# Patient Record
Sex: MALE | Race: WHITE | NOT HISPANIC OR LATINO | Employment: FULL TIME | URBAN - METROPOLITAN AREA
[De-identification: names, ages, dates, MRNs, and addresses within clinical notes are randomized per-mention and may not be internally consistent; named-entity substitution may affect disease eponyms.]

---

## 2017-10-10 ENCOUNTER — GENERIC CONVERSION - ENCOUNTER (OUTPATIENT)
Dept: OTHER | Facility: OTHER | Age: 22
End: 2017-10-10

## 2017-10-11 ENCOUNTER — APPOINTMENT (OUTPATIENT)
Dept: PHYSICAL THERAPY | Facility: CLINIC | Age: 22
End: 2017-10-11
Payer: COMMERCIAL

## 2017-10-11 DIAGNOSIS — M25.511 PAIN IN RIGHT SHOULDER: ICD-10-CM

## 2017-10-11 PROCEDURE — 97140 MANUAL THERAPY 1/> REGIONS: CPT

## 2017-10-11 PROCEDURE — 97161 PT EVAL LOW COMPLEX 20 MIN: CPT

## 2017-10-11 PROCEDURE — 97010 HOT OR COLD PACKS THERAPY: CPT

## 2017-10-11 PROCEDURE — G8990 OTHER PT/OT CURRENT STATUS: HCPCS

## 2017-10-11 PROCEDURE — 97014 ELECTRIC STIMULATION THERAPY: CPT

## 2017-10-11 PROCEDURE — G8991 OTHER PT/OT GOAL STATUS: HCPCS

## 2017-10-13 ENCOUNTER — APPOINTMENT (OUTPATIENT)
Dept: PHYSICAL THERAPY | Facility: CLINIC | Age: 22
End: 2017-10-13
Payer: COMMERCIAL

## 2017-10-13 PROCEDURE — 97010 HOT OR COLD PACKS THERAPY: CPT

## 2017-10-13 PROCEDURE — 97140 MANUAL THERAPY 1/> REGIONS: CPT

## 2017-10-13 PROCEDURE — 97014 ELECTRIC STIMULATION THERAPY: CPT

## 2017-10-13 PROCEDURE — 97110 THERAPEUTIC EXERCISES: CPT

## 2017-10-18 ENCOUNTER — APPOINTMENT (OUTPATIENT)
Dept: PHYSICAL THERAPY | Facility: CLINIC | Age: 22
End: 2017-10-18
Payer: COMMERCIAL

## 2017-10-18 PROCEDURE — 97110 THERAPEUTIC EXERCISES: CPT

## 2017-10-18 PROCEDURE — 97010 HOT OR COLD PACKS THERAPY: CPT

## 2017-10-18 PROCEDURE — 97140 MANUAL THERAPY 1/> REGIONS: CPT

## 2017-10-18 PROCEDURE — 97014 ELECTRIC STIMULATION THERAPY: CPT

## 2017-10-18 PROCEDURE — 97112 NEUROMUSCULAR REEDUCATION: CPT

## 2017-10-20 ENCOUNTER — APPOINTMENT (OUTPATIENT)
Dept: PHYSICAL THERAPY | Facility: CLINIC | Age: 22
End: 2017-10-20
Payer: COMMERCIAL

## 2017-10-20 PROCEDURE — 97140 MANUAL THERAPY 1/> REGIONS: CPT

## 2017-10-20 PROCEDURE — 97112 NEUROMUSCULAR REEDUCATION: CPT

## 2017-10-20 PROCEDURE — 97014 ELECTRIC STIMULATION THERAPY: CPT

## 2017-10-20 PROCEDURE — 97110 THERAPEUTIC EXERCISES: CPT

## 2017-10-25 ENCOUNTER — APPOINTMENT (OUTPATIENT)
Dept: PHYSICAL THERAPY | Facility: CLINIC | Age: 22
End: 2017-10-25
Payer: COMMERCIAL

## 2017-10-27 ENCOUNTER — APPOINTMENT (OUTPATIENT)
Dept: PHYSICAL THERAPY | Facility: CLINIC | Age: 22
End: 2017-10-27
Payer: COMMERCIAL

## 2017-11-09 ENCOUNTER — GENERIC CONVERSION - ENCOUNTER (OUTPATIENT)
Dept: OTHER | Facility: OTHER | Age: 22
End: 2017-11-09

## 2018-09-26 ENCOUNTER — OFFICE VISIT (OUTPATIENT)
Dept: FAMILY MEDICINE CLINIC | Facility: CLINIC | Age: 23
End: 2018-09-26
Payer: COMMERCIAL

## 2018-09-26 VITALS
DIASTOLIC BLOOD PRESSURE: 70 MMHG | HEART RATE: 80 BPM | WEIGHT: 232 LBS | RESPIRATION RATE: 20 BRPM | HEIGHT: 72 IN | BODY MASS INDEX: 31.42 KG/M2 | SYSTOLIC BLOOD PRESSURE: 104 MMHG

## 2018-09-26 DIAGNOSIS — F41.9 ANXIETY AND DEPRESSION: ICD-10-CM

## 2018-09-26 DIAGNOSIS — E80.4 GILBERT'S SYNDROME: ICD-10-CM

## 2018-09-26 DIAGNOSIS — S96.101S: ICD-10-CM

## 2018-09-26 DIAGNOSIS — F32.A ANXIETY AND DEPRESSION: ICD-10-CM

## 2018-09-26 DIAGNOSIS — R55 NEAR SYNCOPE: ICD-10-CM

## 2018-09-26 DIAGNOSIS — M79.674 PAIN OF TOE OF RIGHT FOOT: Primary | ICD-10-CM

## 2018-09-26 PROBLEM — Z87.19 S/P LEFT INGUINAL HERNIA REPAIR: Status: ACTIVE | Noted: 2018-01-16

## 2018-09-26 PROBLEM — Z98.890 S/P LEFT INGUINAL HERNIA REPAIR: Status: ACTIVE | Noted: 2018-01-16

## 2018-09-26 PROCEDURE — 99214 OFFICE O/P EST MOD 30 MIN: CPT | Performed by: FAMILY MEDICINE

## 2018-09-26 PROCEDURE — 3008F BODY MASS INDEX DOCD: CPT | Performed by: FAMILY MEDICINE

## 2018-09-26 RX ORDER — PAROXETINE HYDROCHLORIDE 20 MG/1
20 TABLET, FILM COATED ORAL DAILY
Qty: 30 TABLET | Refills: 3 | Status: SHIPPED | OUTPATIENT
Start: 2018-09-26 | End: 2018-10-24 | Stop reason: SDUPTHER

## 2018-09-26 RX ORDER — PAROXETINE HYDROCHLORIDE 20 MG/1
10 TABLET, FILM COATED ORAL DAILY
Qty: 30 TABLET | Refills: 3 | Status: SHIPPED | OUTPATIENT
Start: 2018-09-26 | End: 2018-09-26 | Stop reason: SDUPTHER

## 2018-09-26 NOTE — ASSESSMENT & PLAN NOTE
Patient's start counseling tomorrow    Paxil was ordered risk and benefit discussed especially drowsiness increased risk of suicidal ideation

## 2018-09-26 NOTE — PROGRESS NOTES
Assessment/Plan:  1  Right toe pain 2  Question extensor tendon tear patient to continue spleen, MRI of area is ordered  Copy going to patient's podiatrist, Dr Luis Warner  3  Anxiety/depression, patient is to continue 10 you with counseling he starts tomorrow  Will start Paxil risk and benefit discussed especially increased risk of suicidal ideation as well as drowsiness  4  Near-syncope status post ER evaluation workup benign  5  Gilbert's syndrome, chronic elevated bilirubin  6  Recheck 1 month, sooner if needed          Pain of toe of right foot  MRI is ordered    Unspecified injury of muscle and tendon of long extensor muscle of toe at ankle and foot level, right foot, sequela  Continue Fleet per Podiatry check MRI rule out tendon rupture/tear    Anxiety and depression  Patient's start counseling tomorrow  Paxil was ordered risk and benefit discussed especially drowsiness increased risk of suicidal ideation    Gilbert's syndrome  Chronic ER blood work shows mildly elevated bilirubin       Diagnoses and all orders for this visit:    Pain of toe of right foot  -     Cancel: MRI foot/forefoot toest right wo contrast; Future  -     MRI foot/forefoot toest right wo contrast; Future    Unspecified injury of muscle and tendon of long extensor muscle of toe at ankle and foot level, right foot, sequela  -     Cancel: MRI foot/forefoot toest right wo contrast; Future  -     MRI foot/forefoot toest right wo contrast; Future    Anxiety and depression  -     PARoxetine (PAXIL) 20 mg tablet; Take 0 5 tablets (10 mg total) by mouth daily    Near syncope    Gilbert's syndrome          Subjective: pt here for an er follow up for toe pain  Pt also states he also went to er for anxiety  MYKE Mtz     Patient ID: [place] is a 26 y o  male  The other week will patient was stepping down from recurrent he felt a pop and excruciating pain right 2nd toe  Patient was seen at [facility] ER, x-rays were negative  Patient was seen by Podiatry yesterday  Placed in a splint  Patient still with pain  Second problem patient had a near syncopal episode was seen in the ER last night  Blood work and workup was discussed from [facility] with the patient  Patient feels mildly depressed and very anxious since the death of his grandmother few months ago  Negative SI/HI  ER did set him up with psychologist and has a an appointment tomorrow for counseling  Discussed risk and benefit of pharmacologic treatment  Patient wishes to start treatment  The following portions of the patient's history were reviewed and updated as appropriate: allergies, current medications, past family history, past medical history, past social history, past surgical history and problem list     Review of Systems   Constitutional: Negative  HENT: Negative  Eyes: Negative  Respiratory: Negative  Cardiovascular: Negative  Gastrointestinal: Negative  Genitourinary: Negative  Musculoskeletal:        HPI   Skin: Negative  Allergic/Immunologic: Positive for environmental allergies  Neurological:        HPI   Hematological: Negative  Psychiatric/Behavioral:        HPI         Objective:    Vitals:    09/26/18 1245   BP: 104/70   BP Location: Left arm   Patient Position: Sitting   Cuff Size: Large   Pulse: 80   Resp: 20   Weight: 105 kg (232 lb)   Height: 5' 11 5" (1 816 m)          Physical Exam   Constitutional: He is oriented to person, place, and time  He appears well-developed and well-nourished  HENT:   Head: Normocephalic and atraumatic  Mouth/Throat: Oropharynx is clear and moist    Eyes: Conjunctivae and EOM are normal  Pupils are equal, round, and reactive to light  No scleral icterus  Neck: Neck supple  No thyromegaly present  Cardiovascular: Normal rate, regular rhythm and normal heart sounds  Pulmonary/Chest: Effort normal and breath sounds normal    Abdominal: Soft  There is no tenderness  Musculoskeletal: He exhibits tenderness  Right 2nd toe with exquisite tenderness at the IP joint with ecchymoses question decreased range of motion   Lymphadenopathy:     He has no cervical adenopathy  Neurological: He is alert and oriented to person, place, and time  Skin: Skin is warm and dry     Ecchymosis right 2nd toe extensor surface IP joint   Psychiatric:   Mildly anxious

## 2018-09-26 NOTE — PATIENT INSTRUCTIONS
Follow-up with counseling, start Paxil at bedtime, complete MRI, return in 4 weeks, sooner if needed

## 2018-10-08 ENCOUNTER — HOSPITAL ENCOUNTER (OUTPATIENT)
Dept: MRI IMAGING | Facility: HOSPITAL | Age: 23
Discharge: HOME | End: 2018-10-08
Payer: COMMERCIAL

## 2018-10-08 DIAGNOSIS — S96.101S: ICD-10-CM

## 2018-10-08 DIAGNOSIS — M79.674 PAIN OF TOE OF RIGHT FOOT: ICD-10-CM

## 2018-10-08 PROCEDURE — 73718 MRI LOWER EXTREMITY W/O DYE: CPT

## 2018-10-22 PROBLEM — F32.9 MAJOR DEPRESSIVE DISORDER, SINGLE EPISODE WITH ANXIOUS DISTRESS: Status: ACTIVE | Noted: 2018-09-27

## 2018-10-24 ENCOUNTER — OFFICE VISIT (OUTPATIENT)
Dept: FAMILY MEDICINE CLINIC | Facility: CLINIC | Age: 23
End: 2018-10-24
Payer: COMMERCIAL

## 2018-10-24 VITALS
BODY MASS INDEX: 31.91 KG/M2 | HEART RATE: 72 BPM | SYSTOLIC BLOOD PRESSURE: 120 MMHG | WEIGHT: 232 LBS | DIASTOLIC BLOOD PRESSURE: 74 MMHG

## 2018-10-24 DIAGNOSIS — M79.674 PAIN OF TOE OF RIGHT FOOT: ICD-10-CM

## 2018-10-24 DIAGNOSIS — J30.9 ALLERGIC RHINITIS, UNSPECIFIED SEASONALITY, UNSPECIFIED TRIGGER: ICD-10-CM

## 2018-10-24 DIAGNOSIS — S96.101S: ICD-10-CM

## 2018-10-24 DIAGNOSIS — Z23 NEED FOR INFLUENZA VACCINATION: ICD-10-CM

## 2018-10-24 DIAGNOSIS — F41.9 ANXIETY AND DEPRESSION: Primary | ICD-10-CM

## 2018-10-24 DIAGNOSIS — E80.4 GILBERT'S SYNDROME: ICD-10-CM

## 2018-10-24 DIAGNOSIS — F32.9 MAJOR DEPRESSIVE DISORDER, SINGLE EPISODE WITH ANXIOUS DISTRESS: ICD-10-CM

## 2018-10-24 DIAGNOSIS — Z13.220 SCREENING FOR LIPID DISORDERS: ICD-10-CM

## 2018-10-24 DIAGNOSIS — Z00.00 HEALTH CARE MAINTENANCE: ICD-10-CM

## 2018-10-24 DIAGNOSIS — F32.A ANXIETY AND DEPRESSION: Primary | ICD-10-CM

## 2018-10-24 PROCEDURE — 90471 IMMUNIZATION ADMIN: CPT

## 2018-10-24 PROCEDURE — 99214 OFFICE O/P EST MOD 30 MIN: CPT | Performed by: FAMILY MEDICINE

## 2018-10-24 PROCEDURE — 90686 IIV4 VACC NO PRSV 0.5 ML IM: CPT

## 2018-10-24 PROCEDURE — 1036F TOBACCO NON-USER: CPT | Performed by: FAMILY MEDICINE

## 2018-10-24 RX ORDER — PAROXETINE HYDROCHLORIDE 20 MG/1
20 TABLET, FILM COATED ORAL DAILY
Qty: 30 TABLET | Refills: 5 | Status: SHIPPED | OUTPATIENT
Start: 2018-10-24 | End: 2018-11-26 | Stop reason: SDUPTHER

## 2018-10-24 NOTE — PROGRESS NOTES
Assessment/Plan:    1  Depression/anxiety, doing much better on Paxil 20 mg refill was given  2  Toe pain/abnormal MRI possible tear patient is following with Podiatry, Dr Kyler Nagel  3  Gilbert's syndrome, blood work is ordered monitor bilirubin  4  Allergic rhinitis, asymptomatic patient may use Claritin if needed  5  Health care maintenance, routine blood work is ordered, flu vaccine was given  6  Recheck 4 months, sooner if need        Health care maintenance  Flu vaccine today  Routine blood work is ordered    Allergic rhinitis  Asymptomatic at present  Patient use Claritin if needed    Unspecified injury of muscle and tendon of long extensor muscle of toe at ankle and foot level, right foot, sequela  Follow-up with Podiatry    Pain of toe of right foot  MRI discussed, patient follows up with Podiatry    Major depressive disorder, single episode with anxious distress  Stable continue Paxil    Gilbert's syndrome  Monitor bilirubin    Anxiety and depression  Stable continue Paxil       Diagnoses and all orders for this visit:    Anxiety and depression  -     PARoxetine (PAXIL) 20 mg tablet; Take 1 tablet (20 mg total) by mouth daily  -     CBC; Future  -     Comprehensive metabolic panel; Future  -     Lipid Panel with Direct LDL reflex; Future  -     TSH, 3rd generation with Free T4 reflex; Future  -     Urinalysis with reflex to microscopic; Future    Pain of toe of right foot  -     CBC; Future  -     Comprehensive metabolic panel; Future  -     Lipid Panel with Direct LDL reflex; Future  -     TSH, 3rd generation with Free T4 reflex; Future  -     Urinalysis with reflex to microscopic; Future    Unspecified injury of muscle and tendon of long extensor muscle of toe at ankle and foot level, right foot, sequela  -     CBC; Future  -     Comprehensive metabolic panel; Future  -     Lipid Panel with Direct LDL reflex; Future  -     TSH, 3rd generation with Free T4 reflex;  Future  -     Urinalysis with reflex to microscopic; Future    Major depressive disorder, single episode with anxious distress  -     CBC; Future  -     Comprehensive metabolic panel; Future  -     Lipid Panel with Direct LDL reflex; Future  -     TSH, 3rd generation with Free T4 reflex; Future  -     Urinalysis with reflex to microscopic; Future    Gilbert's syndrome  -     CBC; Future  -     Comprehensive metabolic panel; Future  -     Lipid Panel with Direct LDL reflex; Future  -     TSH, 3rd generation with Free T4 reflex; Future  -     Urinalysis with reflex to microscopic; Future    Health care maintenance  -     CBC; Future  -     Comprehensive metabolic panel; Future  -     Lipid Panel with Direct LDL reflex; Future  -     TSH, 3rd generation with Free T4 reflex; Future  -     Urinalysis with reflex to microscopic; Future    Allergic rhinitis, unspecified seasonality, unspecified trigger  -     CBC; Future  -     Comprehensive metabolic panel; Future  -     Lipid Panel with Direct LDL reflex; Future  -     TSH, 3rd generation with Free T4 reflex; Future  -     Urinalysis with reflex to microscopic; Future    Screening for lipid disorders  -     CBC; Future  -     Comprehensive metabolic panel; Future  -     Lipid Panel with Direct LDL reflex; Future  -     TSH, 3rd generation with Free T4 reflex; Future  -     Urinalysis with reflex to microscopic; Future          Subjective: patient is here for a 4 week f/u re: anxiety/depression  Patient states that Paxil is working well  Patient still c/o right 2nd toe pain  Patient is aware of the MRI results  ak     Patient ID: Sanjay Rodriguez is a 26 y o  male  Patient's anxiety depression doing much better on Paxil  Patient feels that his really helped  Patient has seen podiatry, Dr Zahira Prieto and is in a splint by Podiatry for a tendon tear          The following portions of the patient's history were reviewed and updated as appropriate: allergies, current medications, past family history, past medical history, past social history, past surgical history and problem list     Review of Systems   Constitutional: Negative  HENT: Negative  Eyes: Negative  Respiratory: Negative  Cardiovascular: Negative  Gastrointestinal: Negative  Endocrine: Negative  Genitourinary: Negative  Musculoskeletal:        HPI   Skin: Negative  Allergic/Immunologic: Negative  Neurological: Negative  Hematological: Negative  Psychiatric/Behavioral:        HPI         Objective:      /74   Pulse 72   Wt 105 kg (232 lb)   BMI 31 91 kg/m²          Physical Exam   Constitutional: He is oriented to person, place, and time  He appears well-developed and well-nourished  HENT:   Head: Normocephalic and atraumatic  Mouth/Throat: Oropharynx is clear and moist    Eyes: Pupils are equal, round, and reactive to light  Conjunctivae and EOM are normal  No scleral icterus  Neck: Neck supple  No thyromegaly present  Cardiovascular: Normal rate, regular rhythm and normal heart sounds  Pulmonary/Chest: Effort normal and breath sounds normal    Abdominal: Soft  Bowel sounds are normal  There is no tenderness  Musculoskeletal: He exhibits no edema  Lymphadenopathy:     He has no cervical adenopathy  Neurological: He is alert and oriented to person, place, and time  No cranial nerve deficit  Skin: Skin is warm and dry  Psychiatric: He has a normal mood and affect

## 2018-11-26 ENCOUNTER — TELEPHONE (OUTPATIENT)
Dept: FAMILY MEDICINE CLINIC | Facility: CLINIC | Age: 23
End: 2018-11-26

## 2018-11-26 DIAGNOSIS — F41.9 ANXIETY AND DEPRESSION: ICD-10-CM

## 2018-11-26 DIAGNOSIS — F32.A ANXIETY AND DEPRESSION: ICD-10-CM

## 2018-11-26 RX ORDER — PAROXETINE HYDROCHLORIDE 20 MG/1
20 TABLET, FILM COATED ORAL DAILY
Qty: 30 TABLET | Refills: 5 | Status: SHIPPED | OUTPATIENT
Start: 2018-11-26 | End: 2019-02-21 | Stop reason: ALTCHOICE

## 2018-11-26 NOTE — TELEPHONE ENCOUNTER
Patient is looking for a refill on his Paxil 20mg sent to the [facility] on [address] in [place]  He has one pill left  Thank you!

## 2018-11-28 ENCOUNTER — TELEPHONE (OUTPATIENT)
Dept: FAMILY MEDICINE CLINIC | Facility: CLINIC | Age: 23
End: 2018-11-28

## 2018-11-28 NOTE — TELEPHONE ENCOUNTER
Pt called and states that he is on his mothers insurance and she is in the process of getting another job which means pt  Will be out of insurance for about the next 3 months if mother gets the job,I told pt  He should call around to different pharmacies and check prices if pt would have to pay out of pocket for this, I told pt  I would also check with you to see if there was something cheaper

## 2018-11-29 NOTE — TELEPHONE ENCOUNTER
Dinner Paxil is 1 the cheapest anxiety/depression medications there is  I do not believe there is a cheaper alternative    I agree, patient to call around to see if he can find a cheaper at another pharmacy

## 2018-12-03 ENCOUNTER — APPOINTMENT (OUTPATIENT)
Dept: LAB | Facility: HOSPITAL | Age: 23
End: 2018-12-03
Payer: COMMERCIAL

## 2018-12-03 ENCOUNTER — TELEPHONE (OUTPATIENT)
Dept: FAMILY MEDICINE CLINIC | Facility: CLINIC | Age: 23
End: 2018-12-03

## 2018-12-03 ENCOUNTER — HOSPITAL ENCOUNTER (OUTPATIENT)
Dept: CT IMAGING | Facility: HOSPITAL | Age: 23
Discharge: HOME | End: 2018-12-03
Payer: COMMERCIAL

## 2018-12-03 ENCOUNTER — OFFICE VISIT (OUTPATIENT)
Dept: FAMILY MEDICINE CLINIC | Facility: CLINIC | Age: 23
End: 2018-12-03
Payer: COMMERCIAL

## 2018-12-03 ENCOUNTER — TELEPHONE (OUTPATIENT)
Dept: NEUROLOGY | Facility: CLINIC | Age: 23
End: 2018-12-03

## 2018-12-03 ENCOUNTER — APPOINTMENT (OUTPATIENT)
Dept: LAB | Facility: CLINIC | Age: 23
End: 2018-12-03
Payer: COMMERCIAL

## 2018-12-03 VITALS
BODY MASS INDEX: 32.51 KG/M2 | HEIGHT: 72 IN | WEIGHT: 240 LBS | RESPIRATION RATE: 16 BRPM | SYSTOLIC BLOOD PRESSURE: 118 MMHG | DIASTOLIC BLOOD PRESSURE: 68 MMHG | HEART RATE: 80 BPM

## 2018-12-03 DIAGNOSIS — R41.3 MEMORY DEFICIT: ICD-10-CM

## 2018-12-03 DIAGNOSIS — F41.0 PANIC ATTACK: ICD-10-CM

## 2018-12-03 DIAGNOSIS — F32.9 MAJOR DEPRESSIVE DISORDER, SINGLE EPISODE WITH ANXIOUS DISTRESS: ICD-10-CM

## 2018-12-03 DIAGNOSIS — F32.A ANXIETY AND DEPRESSION: ICD-10-CM

## 2018-12-03 DIAGNOSIS — R40.20 LOSS OF CONSCIOUSNESS (HCC): ICD-10-CM

## 2018-12-03 DIAGNOSIS — F41.9 ANXIETY AND DEPRESSION: ICD-10-CM

## 2018-12-03 DIAGNOSIS — E66.9 OBESITY (BMI 30.0-34.9): ICD-10-CM

## 2018-12-03 DIAGNOSIS — R41.3 MEMORY DEFICIT: Primary | ICD-10-CM

## 2018-12-03 LAB
ALBUMIN SERPL BCP-MCNC: 4 G/DL (ref 3.5–5)
ALP SERPL-CCNC: 45 U/L (ref 46–116)
ALT SERPL W P-5'-P-CCNC: 40 U/L (ref 12–78)
ANION GAP SERPL CALCULATED.3IONS-SCNC: 10 MMOL/L (ref 4–13)
AST SERPL W P-5'-P-CCNC: 24 U/L (ref 5–45)
BILIRUB SERPL-MCNC: 1.48 MG/DL (ref 0.2–1)
BILIRUB UR QL STRIP: NEGATIVE
BUN SERPL-MCNC: 16 MG/DL (ref 5–25)
CALCIUM SERPL-MCNC: 9.3 MG/DL (ref 8.3–10.1)
CHLORIDE SERPL-SCNC: 102 MMOL/L (ref 100–108)
CLARITY UR: CLEAR
CO2 SERPL-SCNC: 26 MMOL/L (ref 21–32)
COLOR UR: YELLOW
CREAT SERPL-MCNC: 0.79 MG/DL (ref 0.6–1.3)
ERYTHROCYTE [DISTWIDTH] IN BLOOD BY AUTOMATED COUNT: 12.5 % (ref 11.6–15.1)
GFR SERPL CREATININE-BSD FRML MDRD: 127 ML/MIN/1.73SQ M
GLUCOSE P FAST SERPL-MCNC: 96 MG/DL (ref 65–99)
GLUCOSE UR STRIP-MCNC: NEGATIVE MG/DL
HCT VFR BLD AUTO: 48.7 % (ref 36.5–49.3)
HGB BLD-MCNC: 16.3 G/DL (ref 12–17)
HGB UR QL STRIP.AUTO: NEGATIVE
KETONES UR STRIP-MCNC: NEGATIVE MG/DL
LEUKOCYTE ESTERASE UR QL STRIP: NEGATIVE
MCH RBC QN AUTO: 30.2 PG (ref 26.8–34.3)
MCHC RBC AUTO-ENTMCNC: 33.5 G/DL (ref 31.4–37.4)
MCV RBC AUTO: 90 FL (ref 82–98)
NITRITE UR QL STRIP: NEGATIVE
PH UR STRIP.AUTO: 7.5 [PH] (ref 4.5–8)
PLATELET # BLD AUTO: 218 THOUSANDS/UL (ref 149–390)
PMV BLD AUTO: 9.6 FL (ref 8.9–12.7)
POTASSIUM SERPL-SCNC: 4 MMOL/L (ref 3.5–5.3)
PROT SERPL-MCNC: 7.8 G/DL (ref 6.4–8.2)
PROT UR STRIP-MCNC: NEGATIVE MG/DL
RBC # BLD AUTO: 5.4 MILLION/UL (ref 3.88–5.62)
SODIUM SERPL-SCNC: 138 MMOL/L (ref 136–145)
SP GR UR STRIP.AUTO: 1.02 (ref 1–1.03)
TSH SERPL DL<=0.05 MIU/L-ACNC: 1.96 UIU/ML (ref 0.36–3.74)
UROBILINOGEN UR QL STRIP.AUTO: 0.2 E.U./DL
WBC # BLD AUTO: 5.51 THOUSAND/UL (ref 4.31–10.16)

## 2018-12-03 PROCEDURE — 81003 URINALYSIS AUTO W/O SCOPE: CPT

## 2018-12-03 PROCEDURE — 82607 VITAMIN B-12: CPT

## 2018-12-03 PROCEDURE — 3008F BODY MASS INDEX DOCD: CPT | Performed by: FAMILY MEDICINE

## 2018-12-03 PROCEDURE — 84443 ASSAY THYROID STIM HORMONE: CPT

## 2018-12-03 PROCEDURE — 82746 ASSAY OF FOLIC ACID SERUM: CPT

## 2018-12-03 PROCEDURE — 82300 ASSAY OF CADMIUM: CPT

## 2018-12-03 PROCEDURE — 83825 ASSAY OF MERCURY: CPT

## 2018-12-03 PROCEDURE — 82175 ASSAY OF ARSENIC: CPT

## 2018-12-03 PROCEDURE — 99214 OFFICE O/P EST MOD 30 MIN: CPT | Performed by: FAMILY MEDICINE

## 2018-12-03 PROCEDURE — 86618 LYME DISEASE ANTIBODY: CPT

## 2018-12-03 PROCEDURE — 85027 COMPLETE CBC AUTOMATED: CPT

## 2018-12-03 PROCEDURE — 70450 CT HEAD/BRAIN W/O DYE: CPT

## 2018-12-03 PROCEDURE — 86592 SYPHILIS TEST NON-TREP QUAL: CPT

## 2018-12-03 PROCEDURE — 83655 ASSAY OF LEAD: CPT

## 2018-12-03 PROCEDURE — 80053 COMPREHEN METABOLIC PANEL: CPT

## 2018-12-03 PROCEDURE — 86617 LYME DISEASE ANTIBODY: CPT

## 2018-12-03 PROCEDURE — 36415 COLL VENOUS BLD VENIPUNCTURE: CPT

## 2018-12-03 RX ORDER — LORAZEPAM 0.5 MG/1
TABLET ORAL
Qty: 30 TABLET | Refills: 0 | Status: SHIPPED | OUTPATIENT
Start: 2018-12-03 | End: 2019-11-19 | Stop reason: ALTCHOICE

## 2018-12-03 NOTE — PATIENT INSTRUCTIONS

## 2018-12-03 NOTE — ASSESSMENT & PLAN NOTE
No driving  I need to report this to [facility] [address] [employer]    CT scan blood work an EEG are ordered

## 2018-12-03 NOTE — TELEPHONE ENCOUNTER
Radiology called stating there were no intercranial abnormalities notes on STAT CT scan  Pt was made aware of this and he will keep follow up appt with you next week  He knows he is to call for sooner appt if any other problems arise before his scheduled appt

## 2018-12-03 NOTE — PROGRESS NOTES
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  CT scan and blood work ordered today  EEG is ordered will consult Neurology  If this reoccurs patient is to have somebody call 911 and patient to go to the ER  3  Anxiety/major depression, continue Paxil at the present time  Patient was warned if he does have a seizure DIS order possibly will need to change medications this can lower seizure threshold  4  Panic attack which may be the cause of above symptomatology  Ativan was ordered risk and benefit discussed  5  Recheck 1 week, sooner if needed  6  No work this week until CT scan and blood work come back      Loss of consciousness ([address] )  No driving  I need to report this to [facility] [address] [employer] transportation  CT scan blood work an EEG are ordered    Memory deficit  Blood work CT scan EEG ordered    Panic attack  Ativan ordered, drowsiness discussed add addiction potential discussed       Diagnoses and all orders for this visit:    Memory deficit  -     CBC; Future  -     Comprehensive metabolic panel; Future  -     TSH, 3rd generation with Free T4 reflex; Future  -     Urinalysis with reflex to microscopic; Future  -     Vitamin B12; Future  -     Folate; Future  -     RPR; Future  -     Lyme Antibody Profile with reflex to WB; Future  -     Heavy metals screen; Future  -     CT head wo contrast; Future  -     EEG awake or drowsy routine; Future    Anxiety and depression  -     CBC; Future  -     Comprehensive metabolic panel; Future  -     TSH, 3rd generation with Free T4 reflex; Future  -     Urinalysis with reflex to microscopic; Future  -     Vitamin B12; Future  -     Folate; Future  -     RPR;  Future  -     Lyme Antibody Profile with reflex to WB; Future  -     Heavy metals screen; Future    Panic attack  -     CBC; Future  -     Comprehensive metabolic panel; Future  -     TSH, 3rd generation with Free T4 reflex; Future  -     Urinalysis with reflex to microscopic; Future  -     Vitamin B12; Future  -     Folate; Future  -     RPR; Future  -     Lyme Antibody Profile with reflex to WB; Future  -     Heavy metals screen; Future  -     LORazepam (ATIVAN) 0 5 mg tablet; Take 1 tablet 3 times daily as needed for anxiety or panic    Loss of consciousness (HCC)  -     CBC; Future  -     Comprehensive metabolic panel; Future  -     TSH, 3rd generation with Free T4 reflex; Future  -     Urinalysis with reflex to microscopic; Future  -     Vitamin B12; Future  -     Folate; Future  -     RPR; Future  -     Lyme Antibody Profile with reflex to WB; Future  -     Heavy metals screen; Future  -     CT head wo contrast; Future  -     EEG awake or drowsy routine; Future    Major depressive disorder, single episode with anxious distress  -     CBC; Future  -     Comprehensive metabolic panel; Future  -     TSH, 3rd generation with Free T4 reflex; Future  -     Urinalysis with reflex to microscopic; Future  -     Vitamin B12; Future  -     Folate; Future  -     RPR; Future  -     Lyme Antibody Profile with reflex to WB; Future  -     Heavy metals screen; Future    Obesity (BMI 30 0-34  9)          Subjective: Pt here with complains of high stress and more anxiety than before  Pt states he thinks he had a seizure last night per his girlfriend  MYKE Mtz     Patient ID: Saniya Valdez is a 26 y o  male  Last evening have patient go for weakness him in bed is eyes rolled back patient's back arch  Got very stiff about 2 min  No shaking no loss of bowel bladder  Patient does not have a memory of this  Patient's girlfriend stated this lasted about 2 min  Patient has never had any of the symptoms before  Patient did not bite his tongue or cheek  Patient states he was in his usual state of health yesterday but did receive a phone call from his child mother and this upset patient greatly before the onset of the symptoms  The following portions of the patient's history were reviewed and updated as appropriate: allergies, current medications, past family history, past medical history, past social history, past surgical history and problem list     Review of Systems   Constitutional: Negative  HENT:        HPI   Eyes: Negative  Respiratory: Negative  Cardiovascular: Negative  Gastrointestinal:        HPI   Endocrine: Negative  Genitourinary:        HPI   Musculoskeletal:        HPI   Skin: Negative  Allergic/Immunologic: Negative  Neurological:        HPI   Hematological: Negative  Psychiatric/Behavioral: Negative  Objective:      Vitals:    12/03/18 1244   BP: 118/68   BP Location: Left arm   Patient Position: Sitting   Cuff Size: Large   Pulse: 80   Resp: 16   Weight: 109 kg (240 lb)   Height: 5' 11 5" (1 816 m)        Physical Exam   Constitutional: He is oriented to person, place, and time  He appears well-developed and well-nourished  HENT:   Head: Normocephalic and atraumatic  Right Ear: External ear normal    Left Ear: External ear normal    Nose: Nose normal    Mouth/Throat: Oropharynx is clear and moist  No oropharyngeal exudate  Eyes: Pupils are equal, round, and reactive to light  Conjunctivae and EOM are normal    Neck: Neck supple  No thyromegaly present  Cardiovascular: Normal rate, regular rhythm and normal heart sounds  Pulmonary/Chest: Effort normal and breath sounds normal    Abdominal: Soft  Bowel sounds are normal  There is no tenderness  Musculoskeletal: He exhibits no edema, tenderness or deformity  Lymphadenopathy:     He has no cervical adenopathy  Neurological: He is alert and oriented to person, place, and time  He has normal reflexes  He displays normal reflexes   No cranial nerve deficit  He exhibits normal muscle tone  Coordination normal    Skin: Skin is warm and dry  Psychiatric: He has a normal mood and affect  BMI Counseling: Body mass index is 33 01 kg/m²  Discussed with patient's BMI with him  The BMI is above average  BMI counseling and education was provided to the patient  Nutrition recommendations include decreasing overall calorie intake

## 2018-12-04 ENCOUNTER — HOSPITAL ENCOUNTER (OUTPATIENT)
Dept: NEUROLOGY | Facility: AMBULATORY SURGERY CENTER | Age: 23
Discharge: HOME | End: 2018-12-04
Payer: COMMERCIAL

## 2018-12-04 DIAGNOSIS — R40.20 LOSS OF CONSCIOUSNESS (HCC): ICD-10-CM

## 2018-12-04 DIAGNOSIS — R41.3 MEMORY DEFICIT: ICD-10-CM

## 2018-12-04 LAB
FOLATE SERPL-MCNC: >20 NG/ML (ref 3.1–17.5)
RPR SER QL: NORMAL
VIT B12 SERPL-MCNC: 458 PG/ML (ref 100–900)

## 2018-12-04 PROCEDURE — 95816 EEG AWAKE AND DROWSY: CPT

## 2018-12-04 PROCEDURE — 95812 EEG 41-60 MINUTES: CPT | Performed by: PSYCHIATRY & NEUROLOGY

## 2018-12-06 LAB
ARSENIC BLD-MCNC: 10 UG/L (ref 2–23)
B BURGDOR IGG SER IA-ACNC: 0.11
B BURGDOR IGM SER IA-ACNC: 0.8
CADMIUM BLD-MCNC: NORMAL UG/L (ref 0–1.2)
LEAD BLD-MCNC: NORMAL UG/DL (ref 0–4)
MERCURY BLD-MCNC: NORMAL UG/L (ref 0–14.9)

## 2018-12-06 NOTE — PROGRESS NOTES
DEPARTMENT OF NEUROLOGICAL SCIENCES  [address] and [facility]        NEW PATIENT EVALUATION NOTE    Patient: Darren Del Cid  Medical Record Number: # [id number]  YOB: 1995  Date of visit: 12/10/2018    Referring provider: Glenis Graham DO    Diagnoses for this encounter:  1  Transient alteration of awareness     2  Panic attack     3  Tension headache       ASSESSMENT     Impression of this 27 yo M with normal developmental hx and ongoing psychosocial stressors, reporting one incident generalized feeling of tension in his body with 2 minutes of shaking with no significant post-ictal period and full awareness of the incident  This was preceded by an argument with his son's mother  No risk factor for seizure except for concussion >15 years ago with no sequelae  EEG and CT head were reassuring, laboratory testing so far was normal and clinical exam was non-focal       We discussed that the only way to prove his incident was a seizure or not is to have it captured on EEG and shown as such to be not  Besides that, per history and examination the chance of seizure is low, though juvenile myoclonic seizure may still occur in his age group  More likely though, his symptoms are anxiety-induced and functional as he was aware of the incident and given above  PLAN     · Reviewed normal CMP, elevated bilirubin (chronic), normal CBC and vit B12, elevated folate, normal TSH and RPR, urine tox  Minimally elevated Lyme IgM (essentially normal) and normal IgG  · Reviewed CT head as normal  EEG was normal  No new neuroimaging indicated  · Will not add on any new medications  · Cautioned against sleep deprivation and skipping of meals  · He will continue the Paxil and lorazepam as needed, f/u with his court order as scheduled  · There is no neurological deficit or finding suspicious for seizure  No work related restrictions required     · Thank you very much for sending me this interesting patient  · The patient has been instructed to call us about any new neurological problems or medication side effects  · Return to Clinic as needed  A total of 60 minutes were spent face-to-face with this patient, of which at least 50% was spent on counseling and coordination of care  We discussed the natural history of the patient's condition, differential diagnosis, level of diagnostic certainty, treatment alternatives and their side effects and possible complications  HISTORY OF PRESENT ILLNESS:     Mr Paresh Kessler is a 26 y o  right handed male with hx of major depression, Gilbert's syndrome, who has been referred to the [facility] for evaluation of memory issues, episode of transient alteration of consciousness  The patient was not accompanied today  History was obtained from patient  - He says he was a full-term product of a , no major childhood illnesses except ADHD on medications until 9th-10th grade  Normal schools and no known delay  High school graduate, now working as a  at [employer] high school and also at [employer]  Trying to enter college  - He says he has been having some "panic attacks" and thinks he may have had a "seizure" on 18  Contrary to what he told his PCP, he tells me today that he was able to recall "everything" about the incident itself  There was no warning to symptoms but he had an argument with his son's mother on the phone an hour before  This was regarding her lack of transportation on her end, but he was upset that she continues to rely on him for all her transportation needs for the last three years  He denies being depressed, no SI/HI  - He had a concussion at age 11 without any surgeries needed at the time and no sequelae from that  No known seizure history for him or for family    - He says he was still awake and was "just about to fall asleep" when incident occurred   He recalls feeling "tightness" starting in both upper thighs before spreading to his torso and arms, followed by the shaking and back arching  Denies losing control of his bowel or bladder  Denies biting his tongue or cheek; "my tongue rolled back"  Afterwards he tells me he was able to return to normal within "a minute"  He does endorse a sense of "feeling panicky" during the event  Patient's girlfriend was present at the time (not present here today) and had endorsed to PCP that it lasted 2 min  He denies having any further similar episodes afterwards  He was ordered to abstain from driving in the meantime  He does endorse having several "scared and panicky" episodes of a different kind, triggered by stress  Maternal grandmother with history of Alzheimer's supposedly  - He recounts having passed by a nursing home where his own grandmother stayed before dying, and he had one such event with heart racing, heavier breathing for several minutes  He has been taking Paxil since Sept 2018 and lorazepam as needed rarely  Main bothersome symptoms today are:   1  The incident as above  He feels otherwise normal and at his baseline  He denies any ongoing memory issues except forgetting his scheduled appt for therapy  He endorse no issues with carrying on his daily activities       Living Situation + ADLs: currently living with mother  Unmarried, with one son   He has primary custody of his 5yo son       REVIEW OF PAST MEDICAL, SOCIAL AND FAMILY HISTORY:  This is the list of problems as per our Medical Records:    Patient Active Problem List    Diagnosis Date Noted    Transient alteration of awareness 12/10/2018    Tension headache 12/10/2018    Loss of consciousness ([facility] [address] ) 12/03/2018    Memory deficit 12/03/2018    Panic attack 12/03/2018    Health care maintenance 10/24/2018    Major depressive disorder, single episode with anxious distress 09/27/2018    Pain of toe of right foot 09/26/2018    Unspecified injury of muscle and tendon of long extensor muscle of toe at ankle and foot level, right foot, sequela 09/26/2018    Anxiety and depression 09/26/2018    S/P left inguinal hernia repair 01/16/2018    Gilbert's syndrome 05/30/2013    Allergic rhinitis 05/23/2012     Past Medical History:   Diagnosis Date    ADHD (attention deficit hyperactivity disorder)     Anxiety      Past Surgical History:   Procedure Laterality Date    NO PAST SURGERIES        Allergies   Allergen Reactions    Azithromycin Hives    Cefuroxime Hives and Rash    Penicillins Hives and Rash      Outpatient Encounter Prescriptions as of 12/10/2018   Medication Sig Dispense Refill    LORazepam (ATIVAN) 0 5 mg tablet Take 1 tablet 3 times daily as needed for anxiety or panic 30 tablet 0    PARoxetine (PAXIL) 20 mg tablet Take 1 tablet (20 mg total) by mouth daily 30 tablet 5     No facility-administered encounter medications on file as of 12/10/2018  Social History   Substance Use Topics    Smoking status: Never Smoker    Smokeless tobacco: Never Used    Alcohol use Yes   He drinks socially 1-2 beer a week at most    Sexually active, more than one partner in history, no hx of STDs  Family History   Problem Relation Age of Onset    No Known Problems Mother     Hypertension Father       REVIEW OF SYSTEMS:  The patient has entered data on an intake form regarding present illness, past medical and surgical history, medications, allergies, family and social history, and a full review of 14 systems  I have reviewed this form with the patient, and all the relevant information has been included on this note  The full review of systems was negative except as stated in HPI and below  Constitutional: Positive for fatigue  Negative for appetite change and fever  HENT: Positive for congestion  Negative for hearing loss, tinnitus, trouble swallowing and voice change  Eyes: Negative  Negative for photophobia and pain  Respiratory: Negative  Negative for shortness of breath  Cardiovascular: Negative  Negative for palpitations  Gastrointestinal: Negative  Negative for nausea  Endocrine: Negative  Negative for cold intolerance and heat intolerance  Genitourinary: Negative  Negative for dysuria, frequency and urgency  Musculoskeletal: Positive for myalgias  Negative for neck pain  Skin: Negative  Allergic/Immunologic: Negative  Neurological: Positive for light-headedness and headaches  Negative for dizziness, tremors, seizures, syncope, facial asymmetry, speech difficulty, weakness and numbness  Positive for tingling  Positive for memory problems     Hematological: Negative  Does not bruise/bleed easily  Psychiatric/Behavioral: Positive for sleep disturbance (increased sleepiness, snoring)  Negative for confusion and hallucinations  The patient is nervous/anxious  PHYSICAL EXAMINATION:     Vital signs:  /60 (BP Location: Right arm, Patient Position: Sitting, Cuff Size: Standard)   Pulse 89   Ht 6' 1" (1 854 m)   Wt 113 kg (249 lb)   BMI 32 85 kg/m²     General:  Well-appearing, well nourished, pleasant patient in no acute distress  Mood and Fund of Knowledge are appropriate  Head:  Normocephalic, atraumatic  Oropharynx and conjunctiva are clear  Speech  No hypophonia, no bradylalia  No scanning speech  Language: Comprehension intact  Neck:  Supple, strong 5/5 forward flexion and retroflexion  Extremities: Range of motion is normal       Cognitive and Mental Exam:  The patient is alert, oriented to self, location, date and situation  Memory is normal to provide accurate details of health history    Cranial Nerves:  CN II:  Funduscopic examination reveals no papilledema  Direct and consensual light reflexes were equally reactive to light symmetrically  No afferent pupillary defect   Visual fields are full to confrontation     CN III / IV / VI: Extraocular movements were full, with normal pursuit and saccades  CN V:   Facial sensation to light touch was intact  CN VII: Face is symmetric with normal strength  CN VIII: Hearing was assessed using the Calibrated Finger Rub Auditory Screening Test (CALFRAST) and was not abnormal (Better than CALFRAST-Strong-70)  CN X:   Palate is up going bilaterally and symmetrically  CN XI:  Neck muscles are strong  CN XII: Tongue protrusion is at midline with normal movements  No dysarthria  Motor:    Dystonia: none  Dyskinesia: none  Myoclonus: none  Chorea: none  Tics: none  Partial MDS-UPDRS III:   Speech: 0  Facial Expression: 0  Tremor (Head):  0  Rest Tremor Severity (RUE/LUE/RLE/LLE/Lip): 0/0/0/0/0  Action Tremor of Hands (R): 0  Action Tremor of Hands (L): 0  Finger tapping (R): 0  Finger tapping (L): 0  Hand clenching (R): 0  Hand clenching (L): 0  KALIE Hand (R): 0  KALIE Hand (L): 0    Arising From Chair: 0  Posture: 0  Gait: 0  Freezing of Gait: 0  Postural Stability: 0  Body Bradykinesia: 0  -------------------------------------------------------------------------------------    Muscle Strength Right Left  Muscle Strength Right Left   Deltoid 5/5 5/5  Hip Adductors 5/5 5/5   Biceps 5/5 5/5  Hip Abductors 5/5 5/5   Triceps 5/5 5/5  Knee Extensors 5/5 5/5   Wrist Extensors 5/5 5/5  Knee Flexors 5/5 5/5   Wrist Flexors 5/5 5/5  Ankle Extensors 5/5 5/5    5/5 5/5  Ankle Flexors 5/5 5/5   Finger Abductors 5/5 5/5       Hip Flexors 5/5 5/5   Hip Extensors 5/5 5/5     Sensory  Intact to Light Touch, Temperature, and vibration sense in all extremities       Coordination:  Finger-to-nose-finger: normal  Finger-following-finger: normal     Gait:  Normal comprehensive gait evaluation, has normal raising, stance, gait, turns, tandem gait, tip-toes, heels and Pull test       Reflexes:    Right Left   Biceps 2/4 2/4   Brachioradialis 2/4 2/4   Triceps 2/4 2/4   Knee 2/4 2/4   Ankle 2/4 2/4      Plantar cutaneous reflex:  Right: flexor  Left: flexor      REVIEW OF ANCILLARY TESTS:   Results for orders placed or performed during the hospital encounter of 18   EEG awake or drowsy routine    Narrative    ELECTROENCEPHALOGRAM (EEG)    Patient Name:  Marquis Heaton  MRN: [id number]    :  1995 File #: [facility]    Age: 26 y o  Begin Date/Time: 2018  3:21 PM       Report date: 2018          Study type: Routine EEG     ICD 10 diagnosis: Spells/Fit NOS R56 9    Start time:   End time: 1504    ---------------------------------------------------------------------------  ----------------------------------------   Patient History:    26year old male who is having a routine EEG following a witnessed episode   of seizure-like activity on 18  Patient reports that there was   generalized shaking, his eyes rolled back, and the entire event lasted 90   seconds to 2 minutes  Denies LOC, tongue bite, or incontinence  Prior to   the event he states that he was in his usual state of health  Patient states that post-ictally he felt tired and fell asleep 20 minutes   later  No known family history of seizures  Maternal grandmother had Alzheimer's  This recording was observed in a 26 y o  male to determine whether spells   of are seizures  Medications include:     Prior to Admission medications    Medication Sig Start Date Authorizing Provider   LORazepam (ATIVAN) 0 5 mg tablet Take 1 tablet 3 times daily as needed for   anxiety or panic 12/3/18 Jamar Schmid DO   PARoxetine (PAXIL) 20 mg tablet Take 1 tablet (20 mg total) by mouth daily   18 Jamar Schmid DO   ---------------------------------------------------------------------------  ----------------------------------------   Description of Procedure:    32 channel digital recording with electrodes placed according to the   International 10-20 system with additional T1/T2 electrodes, EOG, EKG, and   simultaneous video   The recording was technically satisfactory  ---------------------------------------------------------------------------  ----------------------------------------   EEG Description:    The recording was performed with the patient awake, drowsy,  He was   oriented to person , place and time     During wakefulness, the background is symmetric with a well-organized, low   amplitude beta activity is present anteriorly and low-moderate amplitude   alpha activity posteriorly  There is a symmetric 9 to 10  Hz posterior   dominant rhythm that attenuates with eye opening  Drowsiness is characterized by attenuation of the alpha rhythm, central   theta activity, presence of vertex waves    ---------------------------------------------------------------------------  ----------------------------------------   Activation Procedures:    Hyperventilation was performed for 3-4 minutes and did not produce any   changes  Stepped photic stimulation between 1-20 cps was performed and induced   symmetric photic driving  Other findings: The single lead ECG demonstrated normal sinus rhythm     ---------------------------------------------------------------------------  ----------------------------------------   EEG Interpretation: This Routine EEG recorded during wakefulness, drowsiness,  Is normal     Jorge Alberto Yang DO   [facility] [place]       Results for orders placed or performed during the hospital encounter of 12/03/18   CT head wo contrast    Narrative    CT BRAIN - WITHOUT CONTRAST    INDICATION:   R41 3: Other amnesia  R40 20: Unspecified coma  COMPARISON:  5/2/2014  TECHNIQUE:  CT examination of the brain was performed  In addition to axial images, coronal 2D reformatted images were created and submitted for interpretation  Radiation dose length product (DLP) for this visit:  1011 mGy-cm     This examination, like all CT scans performed in the [facility], was performed utilizing techniques to minimize radiation dose exposure, including the use of iterative   reconstruction and automated exposure control  IMAGE QUALITY:  Diagnostic  FINDINGS:    PARENCHYMA:  No intracranial mass, mass effect or midline shift  No CT signs of acute infarction  No acute parenchymal hemorrhage  VENTRICLES AND EXTRA-AXIAL SPACES:  Normal for the patient's age  VISUALIZED ORBITS AND PARANASAL SINUSES:  Unremarkable  CALVARIUM AND EXTRACRANIAL SOFT TISSUES:  Normal       Impression    No acute intracranial abnormality

## 2018-12-07 LAB
B BURGDOR IGG PATRN SER IB-IMP: NEGATIVE
B BURGDOR IGM PATRN SER IB-IMP: NEGATIVE
B BURGDOR18KD IGG SER QL IB: NORMAL
B BURGDOR23KD IGG SER QL IB: NORMAL
B BURGDOR23KD IGM SER QL IB: NORMAL
B BURGDOR28KD IGG SER QL IB: NORMAL
B BURGDOR30KD IGG SER QL IB: NORMAL
B BURGDOR39KD IGG SER QL IB: NORMAL
B BURGDOR39KD IGM SER QL IB: NORMAL
B BURGDOR41KD IGG SER QL IB: NORMAL
B BURGDOR41KD IGM SER QL IB: NORMAL
B BURGDOR45KD IGG SER QL IB: NORMAL
B BURGDOR58KD IGG SER QL IB: NORMAL
B BURGDOR66KD IGG SER QL IB: NORMAL
B BURGDOR93KD IGG SER QL IB: NORMAL

## 2018-12-10 ENCOUNTER — OFFICE VISIT (OUTPATIENT)
Dept: FAMILY MEDICINE CLINIC | Facility: CLINIC | Age: 23
End: 2018-12-10
Payer: COMMERCIAL

## 2018-12-10 ENCOUNTER — OFFICE VISIT (OUTPATIENT)
Dept: NEUROLOGY | Facility: CLINIC | Age: 23
End: 2018-12-10
Payer: COMMERCIAL

## 2018-12-10 VITALS
DIASTOLIC BLOOD PRESSURE: 80 MMHG | HEART RATE: 88 BPM | HEIGHT: 73 IN | BODY MASS INDEX: 32.07 KG/M2 | WEIGHT: 242 LBS | SYSTOLIC BLOOD PRESSURE: 122 MMHG

## 2018-12-10 VITALS
BODY MASS INDEX: 33 KG/M2 | SYSTOLIC BLOOD PRESSURE: 118 MMHG | HEART RATE: 89 BPM | HEIGHT: 73 IN | DIASTOLIC BLOOD PRESSURE: 60 MMHG | WEIGHT: 249 LBS

## 2018-12-10 DIAGNOSIS — R41.3 MEMORY DEFICIT: ICD-10-CM

## 2018-12-10 DIAGNOSIS — G44.209 TENSION HEADACHE: ICD-10-CM

## 2018-12-10 DIAGNOSIS — F41.9 ANXIETY AND DEPRESSION: ICD-10-CM

## 2018-12-10 DIAGNOSIS — R40.4 TRANSIENT ALTERATION OF AWARENESS: Primary | ICD-10-CM

## 2018-12-10 DIAGNOSIS — F32.A ANXIETY AND DEPRESSION: ICD-10-CM

## 2018-12-10 DIAGNOSIS — E66.9 CLASS 1 OBESITY WITHOUT SERIOUS COMORBIDITY WITH BODY MASS INDEX (BMI) OF 31.0 TO 31.9 IN ADULT, UNSPECIFIED OBESITY TYPE: ICD-10-CM

## 2018-12-10 DIAGNOSIS — F32.9 MAJOR DEPRESSIVE DISORDER, SINGLE EPISODE WITH ANXIOUS DISTRESS: ICD-10-CM

## 2018-12-10 DIAGNOSIS — E66.9 OBESITY (BMI 30.0-34.9): ICD-10-CM

## 2018-12-10 DIAGNOSIS — F41.0 PANIC ATTACK: ICD-10-CM

## 2018-12-10 PROBLEM — R40.20 LOSS OF CONSCIOUSNESS (HCC): Status: RESOLVED | Noted: 2018-12-03 | Resolved: 2018-12-10

## 2018-12-10 PROCEDURE — 99244 OFF/OP CNSLTJ NEW/EST MOD 40: CPT | Performed by: PSYCHIATRY & NEUROLOGY

## 2018-12-10 PROCEDURE — 99214 OFFICE O/P EST MOD 30 MIN: CPT | Performed by: FAMILY MEDICINE

## 2018-12-10 PROCEDURE — 1036F TOBACCO NON-USER: CPT | Performed by: FAMILY MEDICINE

## 2018-12-10 NOTE — PATIENT INSTRUCTIONS
Diagnoses for this encounter:  1  Transient alteration of awareness     2  Panic attack       · Reviewed normal CMP, elevated bilirubin (chronic), normal CBC and vit B12, elevated folate, normal TSH and RPR, urine tox  Minimally elevated Lyme IgM (essentially normal) and normal IgG  · Reviewed CT head as normal  EEG was normal  No new neuroimaging indicated  · Will not add on any new medications  · He will continue the Paxil and lorazepam as needed, f/u with his court order as scheduled  · There is no neurological deficit or finding suspicious for seizure  No work related restrictions required  · Thank you very much for sending me this interesting patient  · The patient has been instructed to call us about any new neurological problems or medication side effects  · Return to Clinic as needed

## 2018-12-10 NOTE — LETTER
December 10, 2018     Patient: Nilda Franco   YOB: 1995   Date of Visit: 12/10/2018       To Whom it May Concern:    Jamal Bansal is under my professional care  He was seen in my office on 12/10/2018  He may return to work on 12/11/2018  If you have any questions or concerns, please don't hesitate to call           Sincerely,          Jamar Schmid DO        CC: No Recipients

## 2018-12-10 NOTE — PROGRESS NOTES
Assessment/Plan:  1  Transient alteration of conscious, patient's workup was negative Neurology states that they do not feel this was a seizure  And has given the patient no restrictions  Patient may return to work and driving  2  Memory deficit, patient can now recall episodes of the evening  Has no deficit at the present time  3  Panic attack, possibly cause of above  4  Depression/anxiety, stable continue present therapy  5  Obesity, diet exercise and weight loss recommended  6  Patient return at scheduled appointment  If reoccurs a consideration be for psychiatric consultation        Transient alteration of awareness  Patient's CT scan, blood work, EEG are all negative  Per Neurology consultation earlier today, no seizure  Patient now has complete memory of the incident  Per Neurology he believes this was a panic or anxiety related symptomatology    Panic attack  If reoccurs consideration be for psychiatry consultation    Major depressive disorder, single episode with anxious distress  Stable continue present therapy    Memory deficit  Patient does now recall prior episode as full memory of a vent    Obesity  Increase exercise weight loss recommended       Diagnoses and all orders for this visit:    Transient alteration of awareness    Panic attack    Major depressive disorder, single episode with anxious distress    Anxiety and depression    Memory deficit    Class 1 obesity without serious comorbidity with body mass index (BMI) of 31 0 to 31 9 in adult, unspecified obesity type          Subjective:   1 week follow up loss of consciousness, memory deficit, panic attack  Had appt with Neurology today  mjs     Patient ID: Oren Mclean is a 26 y o  male  Patient doing well no further episodes of last office visit  Patient's CT scan, blood work, an EEG will normal   Neurology consultation earlier today, Dr Randell Watkins, stated that he does not feel there was a seizure problem    Patient now has complete memory of the incident  And patient has no restrictions  The following portions of the patient's history were reviewed and updated as appropriate: allergies, current medications, past family history, past medical history, past social history, past surgical history and problem list     Review of Systems   Constitutional: Negative  HENT: Negative  Eyes: Negative  Respiratory: Negative  Cardiovascular: Negative  Gastrointestinal: Negative  Endocrine: Negative  Genitourinary: Negative  Musculoskeletal: Negative  Skin: Negative  Negative for color change  Allergic/Immunologic: Negative  Neurological:        HPI   Hematological: Negative  Psychiatric/Behavioral: Negative  Objective:      /80   Pulse 88   Ht 6' 1" (1 854 m)   Wt 110 kg (242 lb)   BMI 31 93 kg/m²          Physical Exam   Constitutional: He is oriented to person, place, and time  He appears well-developed and well-nourished  HENT:   Head: Normocephalic and atraumatic  Mouth/Throat: Oropharynx is clear and moist    Eyes: Pupils are equal, round, and reactive to light  Conjunctivae are normal  No scleral icterus  Neck: Neck supple  No thyromegaly present  Cardiovascular: Normal rate, regular rhythm and normal heart sounds  Pulmonary/Chest: Effort normal and breath sounds normal    Abdominal: Soft  Bowel sounds are normal  There is no tenderness  Musculoskeletal: He exhibits no edema  Lymphadenopathy:     He has no cervical adenopathy  Neurological: He is alert and oriented to person, place, and time  No cranial nerve deficit  He exhibits normal muscle tone  Skin: Skin is warm and dry  Psychiatric: He has a normal mood and affect  BMI Counseling: Body mass index is 31 93 kg/m²  Discussed the patient's BMI with him  The BMI is above average  BMI counseling and education was provided to the patient  Nutrition recommendations include decreasing overall calorie intake  Exercise recommendations include moderate aerobic physical activity for 150 minutes/week

## 2018-12-10 NOTE — PROGRESS NOTES
Review of Systems   Constitutional: Positive for fatigue  Negative for appetite change and fever  HENT: Positive for congestion  Negative for hearing loss, tinnitus, trouble swallowing and voice change  Eyes: Negative  Negative for photophobia and pain  Respiratory: Negative  Negative for shortness of breath  Cardiovascular: Negative  Negative for palpitations  Gastrointestinal: Negative  Negative for nausea  Endocrine: Negative  Negative for cold intolerance and heat intolerance  Genitourinary: Negative  Negative for dysuria, frequency and urgency  Musculoskeletal: Positive for myalgias  Negative for neck pain  Skin: Negative  Allergic/Immunologic: Negative  Neurological: Positive for light-headedness and headaches  Negative for dizziness, tremors, seizures, syncope, facial asymmetry, speech difficulty, weakness and numbness  Positive for tingling  Positive for memory problems     Hematological: Negative  Does not bruise/bleed easily  Psychiatric/Behavioral: Positive for sleep disturbance (increased sleepiness, snoring)  Negative for confusion and hallucinations  The patient is nervous/anxious

## 2018-12-10 NOTE — LETTER
December 10, 2018     [place], [address] [address] [address]    Patient: Rody Mendoza   YOB: 1995   Date of Visit: 12/10/2018       Dear Dr Leatha Harrison: Thank you for referring Radha Baez to me for evaluation  Below are my notes for this consultation  If you have questions, please do not hesitate to call me  I look forward to following your patient along with you  Sincerely,        Sarath Young MD        CC: No Recipients  Vergil Bottoms  12/10/2018  1:29 PM  Sign at close encounter  Review of Systems   Constitutional: Positive for fatigue  Negative for appetite change and fever  HENT: Positive for congestion  Negative for hearing loss, tinnitus, trouble swallowing and voice change  Eyes: Negative  Negative for photophobia and pain  Respiratory: Negative  Negative for shortness of breath  Cardiovascular: Negative  Negative for palpitations  Gastrointestinal: Negative  Negative for nausea  Endocrine: Negative  Negative for cold intolerance and heat intolerance  Genitourinary: Negative  Negative for dysuria, frequency and urgency  Musculoskeletal: Positive for myalgias  Negative for neck pain  Skin: Negative  Allergic/Immunologic: Negative  Neurological: Positive for light-headedness and headaches  Negative for dizziness, tremors, seizures, syncope, facial asymmetry, speech difficulty, weakness and numbness  Positive for tingling  Positive for memory problems     Hematological: Negative  Does not bruise/bleed easily  Psychiatric/Behavioral: Positive for sleep disturbance (increased sleepiness, snoring)  Negative for confusion and hallucinations  The patient is nervous/anxious          Sarath Young MD  12/10/2018  2:36 PM  Sign at close encounter  [facility]        NEW PATIENT EVALUATION NOTE    Patient: [facility] Record Number: # [id number]  YOB: 1995  Date of visit: 12/10/2018    Referring provider: Aneta Taylor DO    Diagnoses for this encounter:  1  Transient alteration of awareness     2  Panic attack     3  Tension headache       ASSESSMENT     Impression of this 27 yo M with normal developmental hx and ongoing psychosocial stressors, reporting one incident generalized feeling of tension in his body with 2 minutes of shaking with no significant post-ictal period and full awareness of the incident  This was preceded by an argument with his son's mother  No risk factor for seizure except for concussion >15 years ago with no sequelae  EEG and CT head were reassuring, laboratory testing so far was normal and clinical exam was non-focal       We discussed that the only way to prove his incident was a seizure or not is to have it captured on EEG and shown as such to be not  Besides that, per history and examination the chance of seizure is low, though juvenile myoclonic seizure may still occur in his age group  More likely though, his symptoms are anxiety-induced and functional as he was aware of the incident and given above  PLAN     · Reviewed normal CMP, elevated bilirubin (chronic), normal CBC and vit B12, elevated folate, normal TSH and RPR, urine tox  Minimally elevated Lyme IgM (essentially normal) and normal IgG  · Reviewed CT head as normal  EEG was normal  No new neuroimaging indicated  · Will not add on any new medications  · Cautioned against sleep deprivation and skipping of meals  · He will continue the Paxil and lorazepam as needed, f/u with his court order as scheduled  · There is no neurological deficit or finding suspicious for seizure  No work related restrictions required  · Thank you very much for sending me this interesting patient  · The patient has been instructed to call us about any new neurological problems or medication side effects  · Return to Clinic as needed  A total of 60 minutes were spent face-to-face with this patient, of which at least 50% was spent on counseling and coordination of care  We discussed the natural history of the patient's condition, differential diagnosis, level of diagnostic certainty, treatment alternatives and their side effects and possible complications  HISTORY OF PRESENT ILLNESS:     Mr Aleyda Walker is a 26 y o  right handed male with hx of major depression, Gilbert's syndrome, who has been referred to the [facility] for evaluation of memory issues, episode of transient alteration of consciousness  The patient was not accompanied today  History was obtained from patient  - He says he was a full-term product of a , no major childhood illnesses except ADHD on medications until 9th-10th grade  Normal schools and no known delay  High school graduate, now working as a  at a [employer] high school and also at [employer]  Trying to enter college  - He says he has been having some "panic attacks" and thinks he may have had a "seizure" on 18  He tells me today that he was able to recall "everything" about the incident itself  There was no warning to symptoms but he had an argument with his son's mother on the phone an hour before  This was regarding her lack of transportation on her end, but he was upset that she continues to rely on him for all her transportation needs for the last three years  He denies being depressed, no SI/HI  - He had a concussion at age 11 without any surgeries needed at the time and no sequelae from that  No known seizure history for him or for family    - He says he was still awake and was "just about to fall asleep" when incident occurred  He recalls feeling "tightness" starting in both upper thighs before spreading to his torso and arms, followed by the shaking and back arching  Denies losing control of his bowel or bladder   Denies biting his tongue or cheek; "my tongue rolled back"  Afterwards he tells me he was able to return to normal within "a minute"  He does endorse a sense of "feeling panicky" during the event  Patient's girlfriend was present at the time (not present here today) and had endorsed to PCP that it lasted 2 min  He denies having any further similar episodes afterwards  He was ordered to abstain from driving in the meantime  He does endorse having several "scared and panicky" episodes of a different kind, triggered by stress  Maternal grandmother with history of Alzheimer's supposedly  - He recounts having passed by a nursing home where his own grandmother stayed before dying, and he had one such event with heart racing, heavier breathing for several minutes  He has been taking Paxil since Sept 2018 and lorazepam as needed rarely  Main bothersome symptoms today are:   1  The incident as above  He feels otherwise normal and at his baseline  He denies any ongoing memory issues except forgetting his scheduled appt for therapy  He endorse no issues with carrying on his daily activities       Living Situation + ADLs: currently living with mother  Unmarried, with one son   He has primary custody of his 5yo son       REVIEW OF PAST MEDICAL, SOCIAL AND FAMILY HISTORY:  This is the list of problems as per our Medical Records:    Patient Active Problem List    Diagnosis Date Noted    Transient alteration of awareness 12/10/2018    Tension headache 12/10/2018    Loss of consciousness ([facility] [address] ) 12/03/2018    Memory deficit 12/03/2018    Panic attack 12/03/2018    Health care maintenance 10/24/2018    Major depressive disorder, single episode with anxious distress 09/27/2018    Pain of toe of right foot 09/26/2018    Unspecified injury of muscle and tendon of long extensor muscle of toe at ankle and foot level, right foot, sequela 09/26/2018    Anxiety and depression 09/26/2018    S/P left inguinal hernia repair 01/16/2018    Gilbert's syndrome 05/30/2013  Allergic rhinitis 05/23/2012     Past Medical History:   Diagnosis Date    ADHD (attention deficit hyperactivity disorder)     Anxiety      Past Surgical History:   Procedure Laterality Date    NO PAST SURGERIES        Allergies   Allergen Reactions    Azithromycin Hives    Cefuroxime Hives and Rash    Penicillins Hives and Rash      Outpatient Encounter Prescriptions as of 12/10/2018   Medication Sig Dispense Refill    LORazepam (ATIVAN) 0 5 mg tablet Take 1 tablet 3 times daily as needed for anxiety or panic 30 tablet 0    PARoxetine (PAXIL) 20 mg tablet Take 1 tablet (20 mg total) by mouth daily 30 tablet 5     No facility-administered encounter medications on file as of 12/10/2018  Social History   Substance Use Topics    Smoking status: Never Smoker    Smokeless tobacco: Never Used    Alcohol use Yes   He drinks socially 1-2 beer a week at most    Sexually active, more than one partner in history, no hx of STDs  Family History   Problem Relation Age of Onset    No Known Problems Mother     Hypertension Father       REVIEW OF SYSTEMS:  The patient has entered data on an intake form regarding present illness, past medical and surgical history, medications, allergies, family and social history, and a full review of 14 systems  I have reviewed this form with the patient, and all the relevant information has been included on this note  The full review of systems was negative except as stated in HPI and below  Constitutional: Positive for fatigue  Negative for appetite change and fever  HENT: Positive for congestion  Negative for hearing loss, tinnitus, trouble swallowing and voice change  Eyes: Negative  Negative for photophobia and pain  Respiratory: Negative  Negative for shortness of breath  Cardiovascular: Negative  Negative for palpitations  Gastrointestinal: Negative  Negative for nausea  Endocrine: Negative  Negative for cold intolerance and heat intolerance  Genitourinary: Negative  Negative for dysuria, frequency and urgency  Musculoskeletal: Positive for myalgias  Negative for neck pain  Skin: Negative  Allergic/Immunologic: Negative  Neurological: Positive for light-headedness and headaches  Negative for dizziness, tremors, seizures, syncope, facial asymmetry, speech difficulty, weakness and numbness  Positive for tingling  Positive for memory problems     Hematological: Negative  Does not bruise/bleed easily  Psychiatric/Behavioral: Positive for sleep disturbance (increased sleepiness, snoring)  Negative for confusion and hallucinations  The patient is nervous/anxious  PHYSICAL EXAMINATION:     Vital signs:  /60 (BP Location: Right arm, Patient Position: Sitting, Cuff Size: Standard)   Pulse 89   Ht 6' 1" (1 854 m)   Wt 113 kg (249 lb)   BMI 32 85 kg/m²      General:  Well-appearing, well nourished, pleasant patient in no acute distress  Mood and Fund of Knowledge are appropriate  Head:  Normocephalic, atraumatic  Oropharynx and conjunctiva are clear  Speech  No hypophonia, no bradylalia  No scanning speech  Language: Comprehension intact  Neck:  Supple, strong 5/5 forward flexion and retroflexion  Extremities: Range of motion is normal       Cognitive and Mental Exam:  The patient is alert, oriented to self, location, date and situation  Memory is normal to provide accurate details of health history    Cranial Nerves:  CN II:  Funduscopic examination reveals no papilledema  Direct and consensual light reflexes were equally reactive to light symmetrically  No afferent pupillary defect   Visual fields are full to confrontation  CN III / IV / VI: Extraocular movements were full, with normal pursuit and saccades  CN V:   Facial sensation to light touch was intact  CN VII: Face is symmetric with normal strength     CN VIII: Hearing was assessed using the Calibrated Finger Rub Auditory Screening Test (CALFRAST) and was not abnormal (Better than CALFRAST-Strong-70)  CN X:   Palate is up going bilaterally and symmetrically  CN XI:  Neck muscles are strong  CN XII: Tongue protrusion is at midline with normal movements  No dysarthria  Motor:    Dystonia: none  Dyskinesia: none  Myoclonus: none  Chorea: none  Tics: none  Partial MDS-UPDRS III:   Speech: 0  Facial Expression: 0  Tremor (Head):  0  Rest Tremor Severity (RUE/LUE/RLE/LLE/Lip): 0/0/0/0/0  Action Tremor of Hands (R): 0  Action Tremor of Hands (L): 0  Finger tapping (R): 0  Finger tapping (L): 0  Hand clenching (R): 0  Hand clenching (L): 0  KALIE Hand (R): 0  KALIE Hand (L): 0    Arising From Chair: 0  Posture: 0  Gait: 0  Freezing of Gait: 0  Postural Stability: 0  Body Bradykinesia: 0  -------------------------------------------------------------------------------------    Muscle Strength Right Left  Muscle Strength Right Left   Deltoid 5/5 5/5  Hip Adductors 5/5 5/5   Biceps 5/5 5/5  Hip Abductors 5/5 5/5   Triceps 5/5 5/5  Knee Extensors 5/5 5/5   Wrist Extensors 5/5 5/5  Knee Flexors 5/5 5/5   Wrist Flexors 5/5 5/5  Ankle Extensors 5/5 5/5    5/5 5/5  Ankle Flexors 5/5 5/5   Finger Abductors 5/5 5/5       Hip Flexors 5/5 5/5   Hip Extensors 5/5 5/5     Sensory  Intact to Light Touch, Temperature, and vibration sense in all extremities       Coordination:  Finger-to-nose-finger: normal  Finger-following-finger: normal     Gait:  Normal comprehensive gait evaluation, has normal raising, stance, gait, turns, tandem gait, tip-toes, heels and Pull test       Reflexes:    Right Left   Biceps 2/4 2/4   Brachioradialis 2/4 2/4   Triceps 2/4 2/4   Knee 2/4 2/4   Ankle 2/4 2/4      Plantar cutaneous reflex:  Right: flexor  Left: flexor      REVIEW OF ANCILLARY TESTS:   Results for orders placed or performed during the hospital encounter of 12/04/18   EEG awake or drowsy routine    Narrative    ELECTROENCEPHALOGRAM (EEG)    Patient Name:  Margarito Mohit  MRN: [id number]    :  1995 File #: [facility]    Age: 26 y o  Begin Date/Time: 2018  3:21 PM       Report date: 2018          Study type: Routine EEG     ICD 10 diagnosis: Spells/Fit NOS R56 9    Start time:   End time: 1504    ---------------------------------------------------------------------------  ----------------------------------------   Patient History:    26year old male who is having a routine EEG following a witnessed episode   of seizure-like activity on 18  Patient reports that there was   generalized shaking, his eyes rolled back, and the entire event lasted 90   seconds to 2 minutes  Denies LOC, tongue bite, or incontinence  Prior to   the event he states that he was in his usual state of health  Patient states that post-ictally he felt tired and fell asleep 20 minutes   later  No known family history of seizures  Maternal grandmother had Alzheimer's  This recording was observed in a 26 y o  male to determine whether spells   of are seizures  Medications include:     Prior to Admission medications    Medication Sig Start Date Authorizing Provider   LORazepam (ATIVAN) 0 5 mg tablet Take 1 tablet 3 times daily as needed for   anxiety or panic 12/3/18 Jamar Schmid DO   PARoxetine (PAXIL) 20 mg tablet Take 1 tablet (20 mg total) by mouth daily   18 Jamar Schmid DO   ---------------------------------------------------------------------------  ----------------------------------------   Description of Procedure:    32 channel digital recording with electrodes placed according to the   International 10-20 system with additional T1/T2 electrodes, EOG, EKG, and   simultaneous video  The recording was technically satisfactory      ---------------------------------------------------------------------------  ----------------------------------------   EEG Description:    The recording was performed with the patient awake, drowsy,  He was   oriented to person , place and time     During wakefulness, the background is symmetric with a well-organized, low   amplitude beta activity is present anteriorly and low-moderate amplitude   alpha activity posteriorly  There is a symmetric 9 to 10  Hz posterior   dominant rhythm that attenuates with eye opening  Drowsiness is characterized by attenuation of the alpha rhythm, central   theta activity, presence of vertex waves    ---------------------------------------------------------------------------  ----------------------------------------   Activation Procedures:    Hyperventilation was performed for 3-4 minutes and did not produce any   changes  Stepped photic stimulation between 1-20 cps was performed and induced   symmetric photic driving  Other findings: The single lead ECG demonstrated normal sinus rhythm     ---------------------------------------------------------------------------  ----------------------------------------   EEG Interpretation: This Routine EEG recorded during wakefulness, drowsiness,  Is normal     DO Peng Rasmussen [facility]       Results for orders placed or performed during the hospital encounter of 12/03/18   CT head wo contrast    Narrative    CT BRAIN - WITHOUT CONTRAST    INDICATION:   R41 3: Other amnesia  R40 20: Unspecified coma  COMPARISON:  5/2/2014  TECHNIQUE:  CT examination of the brain was performed  In addition to axial images, coronal 2D reformatted images were created and submitted for interpretation  Radiation dose length product (DLP) for this visit:  1011 mGy-cm   This examination, like all CT scans performed in the [facility], was performed utilizing techniques to minimize radiation dose exposure, including the use of iterative   reconstruction and automated exposure control  IMAGE QUALITY:  Diagnostic      FINDINGS:    PARENCHYMA:  No intracranial mass, mass effect or midline shift  No CT signs of acute infarction  No acute parenchymal hemorrhage  VENTRICLES AND EXTRA-AXIAL SPACES:  Normal for the patient's age  VISUALIZED ORBITS AND PARANASAL SINUSES:  Unremarkable  CALVARIUM AND EXTRACRANIAL SOFT TISSUES:  Normal       Impression    No acute intracranial abnormality

## 2018-12-10 NOTE — LETTER
December 10, 2018     [place], [address] [address] [address]    Patient: Amy Goldman   YOB: 1995   Date of Visit: 12/10/2018       Dear Dr Chrystal Libman: Thank you for referring Mendoza Doty to me for evaluation  Below are my notes for this consultation  If you have questions, please do not hesitate to call me  I look forward to following your patient along with you  Sincerely,        Antonieta Colon MD        CC: No Recipients  Osvaldo Linares  12/10/2018  1:29 PM  Sign at close encounter  Review of Systems   Constitutional: Positive for fatigue  Negative for appetite change and fever  HENT: Positive for congestion  Negative for hearing loss, tinnitus, trouble swallowing and voice change  Eyes: Negative  Negative for photophobia and pain  Respiratory: Negative  Negative for shortness of breath  Cardiovascular: Negative  Negative for palpitations  Gastrointestinal: Negative  Negative for nausea  Endocrine: Negative  Negative for cold intolerance and heat intolerance  Genitourinary: Negative  Negative for dysuria, frequency and urgency  Musculoskeletal: Positive for myalgias  Negative for neck pain  Skin: Negative  Allergic/Immunologic: Negative  Neurological: Positive for light-headedness and headaches  Negative for dizziness, tremors, seizures, syncope, facial asymmetry, speech difficulty, weakness and numbness  Positive for tingling  Positive for memory problems     Hematological: Negative  Does not bruise/bleed easily  Psychiatric/Behavioral: Positive for sleep disturbance (increased sleepiness, snoring)  Negative for confusion and hallucinations  The patient is nervous/anxious          Antonieta Colon MD  12/10/2018  2:40 PM  Sign at close encounter  [facility]        NEW PATIENT EVALUATION NOTE    Patient: Amy Goldman  Medical Record Number: # [id number]  YOB: 1995  Date of visit: 12/10/2018    Referring provider: Doc Chapin DO    Diagnoses for this encounter:  1  Transient alteration of awareness     2  Panic attack     3  Tension headache       ASSESSMENT     Impression of this 27 yo M with normal developmental hx and ongoing psychosocial stressors, reporting one incident generalized feeling of tension in his body with 2 minutes of shaking with no significant post-ictal period and full awareness of the incident  This was preceded by an argument with his son's mother  No risk factor for seizure except for concussion >15 years ago with no sequelae  EEG and CT head were reassuring, laboratory testing so far was normal and clinical exam was non-focal       We discussed that the only way to prove his incident was a seizure or not is to have it captured on EEG and shown as such to be not  Besides that, per history and examination the chance of seizure is low, though juvenile myoclonic seizure may still occur in his age group  More likely though, his symptoms are anxiety-induced and functional as he was aware of the incident and given above  PLAN     · Reviewed normal CMP, elevated bilirubin (chronic), normal CBC and vit B12, elevated folate, normal TSH and RPR, urine tox  Minimally elevated Lyme IgM (essentially normal) and normal IgG  · Reviewed CT head as normal  EEG was normal  No new neuroimaging indicated  · Will not add on any new medications  · Cautioned against sleep deprivation and skipping of meals  · He will continue the Paxil and lorazepam as needed, f/u with his court order as scheduled  · There is no neurological deficit or finding suspicious for seizure  No work related restrictions required  · Thank you very much for sending me this interesting patient  · The patient has been instructed to call us about any new neurological problems or medication side effects  · Return to Clinic as needed  A total of 60 minutes were spent face-to-face with this patient, of which at least 50% was spent on counseling and coordination of care  We discussed the natural history of the patient's condition, differential diagnosis, level of diagnostic certainty, treatment alternatives and their side effects and possible complications  HISTORY OF PRESENT ILLNESS:     Mr Jessica Cooper is a 26 y o  right handed male with hx of major depression, Gilbert's syndrome, who has been referred to the [facility] [address] [place] [address] for evaluation of memory issues, episode of transient alteration of consciousness  The patient was not accompanied today  History was obtained from patient  - He says he was a full-term product of a , no major childhood illnesses except ADHD on medications until 9th-10th grade  Normal schools and no known delay  High school graduate, now working as a  at [employer] and also at [employer]  Trying to enter college  - He says he has been having some "panic attacks" and thinks he may have had a "seizure" on 18  Contrary to what he told his PCP, he tells me today that he was able to recall "everything" about the incident itself  There was no warning to symptoms but he had an argument with his son's mother on the phone an hour before  This was regarding her lack of transportation on her end, but he was upset that she continues to rely on him for all her transportation needs for the last three years  He denies being depressed, no SI/HI  - He had a concussion at age 11 without any surgeries needed at the time and no sequelae from that  No known seizure history for him or for family    - He says he was still awake and was "just about to fall asleep" when incident occurred  He recalls feeling "tightness" starting in both upper thighs before spreading to his torso and arms, followed by the shaking and back arching  Denies losing control of his bowel or bladder  Denies biting his tongue or cheek; "my tongue rolled back"  Afterwards he tells me he was able to return to normal within "a minute"  He does endorse a sense of "feeling panicky" during the event  Patient's girlfriend was present at the time (not present here today) and had endorsed to PCP that it lasted 2 min  He denies having any further similar episodes afterwards  He was ordered to abstain from driving in the meantime  He does endorse having several "scared and panicky" episodes of a different kind, triggered by stress  Maternal grandmother with history of Alzheimer's supposedly  - He recounts having passed by a nursing home where his own grandmother stayed before dying, and he had one such event with heart racing, heavier breathing for several minutes  He has been taking Paxil since Sept 2018 and lorazepam as needed rarely  Main bothersome symptoms today are:   1  The incident as above  He feels otherwise normal and at his baseline  He denies any ongoing memory issues except forgetting his scheduled appt for therapy  He endorse no issues with carrying on his daily activities       Living Situation + ADLs: currently living with mother  Unmarried, with one son   He has primary custody of his 3yo son       REVIEW OF PAST MEDICAL, SOCIAL AND FAMILY HISTORY:  This is the list of problems as per our Medical Records:    Patient Active Problem List    Diagnosis Date Noted    Transient alteration of awareness 12/10/2018    Tension headache 12/10/2018    Loss of consciousness ([facility] [address] ) 12/03/2018    Memory deficit 12/03/2018    Panic attack 12/03/2018    Health care maintenance 10/24/2018    Major depressive disorder, single episode with anxious distress 09/27/2018    Pain of toe of right foot 09/26/2018    Unspecified injury of muscle and tendon of long extensor muscle of toe at ankle and foot level, right foot, sequela 09/26/2018    Anxiety and depression 09/26/2018    S/P left inguinal hernia repair 01/16/2018    Gilbert's syndrome 05/30/2013    Allergic rhinitis 05/23/2012     Past Medical History:   Diagnosis Date    ADHD (attention deficit hyperactivity disorder)     Anxiety      Past Surgical History:   Procedure Laterality Date    NO PAST SURGERIES        Allergies   Allergen Reactions    Azithromycin Hives    Cefuroxime Hives and Rash    Penicillins Hives and Rash      Outpatient Encounter Prescriptions as of 12/10/2018   Medication Sig Dispense Refill    LORazepam (ATIVAN) 0 5 mg tablet Take 1 tablet 3 times daily as needed for anxiety or panic 30 tablet 0    PARoxetine (PAXIL) 20 mg tablet Take 1 tablet (20 mg total) by mouth daily 30 tablet 5     No facility-administered encounter medications on file as of 12/10/2018  Social History   Substance Use Topics    Smoking status: Never Smoker    Smokeless tobacco: Never Used    Alcohol use Yes   He drinks socially 1-2 beer a week at most    Sexually active, more than one partner in history, no hx of STDs  Family History   Problem Relation Age of Onset    No Known Problems Mother     Hypertension Father       REVIEW OF SYSTEMS:  The patient has entered data on an intake form regarding present illness, past medical and surgical history, medications, allergies, family and social history, and a full review of 14 systems  I have reviewed this form with the patient, and all the relevant information has been included on this note  The full review of systems was negative except as stated in HPI and below  Constitutional: Positive for fatigue  Negative for appetite change and fever  HENT: Positive for congestion  Negative for hearing loss, tinnitus, trouble swallowing and voice change  Eyes: Negative  Negative for photophobia and pain  Respiratory: Negative  Negative for shortness of breath  Cardiovascular: Negative  Negative for palpitations  Gastrointestinal: Negative  Negative for nausea  Endocrine: Negative  Negative for cold intolerance and heat intolerance  Genitourinary: Negative  Negative for dysuria, frequency and urgency  Musculoskeletal: Positive for myalgias  Negative for neck pain  Skin: Negative  Allergic/Immunologic: Negative  Neurological: Positive for light-headedness and headaches  Negative for dizziness, tremors, seizures, syncope, facial asymmetry, speech difficulty, weakness and numbness  Positive for tingling  Positive for memory problems     Hematological: Negative  Does not bruise/bleed easily  Psychiatric/Behavioral: Positive for sleep disturbance (increased sleepiness, snoring)  Negative for confusion and hallucinations  The patient is nervous/anxious  PHYSICAL EXAMINATION:     Vital signs:  /60 (BP Location: Right arm, Patient Position: Sitting, Cuff Size: Standard)   Pulse 89   Ht 6' 1" (1 854 m)   Wt 113 kg (249 lb)   BMI 32 85 kg/m²      General:  Well-appearing, well nourished, pleasant patient in no acute distress  Mood and Fund of Knowledge are appropriate  Head:  Normocephalic, atraumatic  Oropharynx and conjunctiva are clear  Speech  No hypophonia, no bradylalia  No scanning speech  Language: Comprehension intact  Neck:  Supple, strong 5/5 forward flexion and retroflexion  Extremities: Range of motion is normal       Cognitive and Mental Exam:  The patient is alert, oriented to self, location, date and situation  Memory is normal to provide accurate details of health history    Cranial Nerves:  CN II:  Funduscopic examination reveals no papilledema  Direct and consensual light reflexes were equally reactive to light symmetrically  No afferent pupillary defect   Visual fields are full to confrontation  CN III / IV / VI: Extraocular movements were full, with normal pursuit and saccades  CN V:   Facial sensation to light touch was intact  CN VII: Face is symmetric with normal strength     CN VIII: Hearing was assessed using the Calibrated Finger Rub Auditory Screening Test (CALFRAST) and was not abnormal (Better than CALFRAST-Strong-70)  CN X:   Palate is up going bilaterally and symmetrically  CN XI:  Neck muscles are strong  CN XII: Tongue protrusion is at midline with normal movements  No dysarthria  Motor:    Dystonia: none  Dyskinesia: none  Myoclonus: none  Chorea: none  Tics: none  Partial MDS-UPDRS III:   Speech: 0  Facial Expression: 0  Tremor (Head):  0  Rest Tremor Severity (RUE/LUE/RLE/LLE/Lip): 0/0/0/0/0  Action Tremor of Hands (R): 0  Action Tremor of Hands (L): 0  Finger tapping (R): 0  Finger tapping (L): 0  Hand clenching (R): 0  Hand clenching (L): 0  KALIE Hand (R): 0  KALIE Hand (L): 0    Arising From Chair: 0  Posture: 0  Gait: 0  Freezing of Gait: 0  Postural Stability: 0  Body Bradykinesia: 0  -------------------------------------------------------------------------------------    Muscle Strength Right Left  Muscle Strength Right Left   Deltoid 5/5 5/5  Hip Adductors 5/5 5/5   Biceps 5/5 5/5  Hip Abductors 5/5 5/5   Triceps 5/5 5/5  Knee Extensors 5/5 5/5   Wrist Extensors 5/5 5/5  Knee Flexors 5/5 5/5   Wrist Flexors 5/5 5/5  Ankle Extensors 5/5 5/5    5/5 5/5  Ankle Flexors 5/5 5/5   Finger Abductors 5/5 5/5       Hip Flexors 5/5 5/5   Hip Extensors 5/5 5/5     Sensory  Intact to Light Touch, Temperature, and vibration sense in all extremities       Coordination:  Finger-to-nose-finger: normal  Finger-following-finger: normal     Gait:  Normal comprehensive gait evaluation, has normal raising, stance, gait, turns, tandem gait, tip-toes, heels and Pull test       Reflexes:    Right Left   Biceps 2/4 2/4   Brachioradialis 2/4 2/4   Triceps 2/4 2/4   Knee 2/4 2/4   Ankle 2/4 2/4      Plantar cutaneous reflex:  Right: flexor  Left: flexor      REVIEW OF ANCILLARY TESTS:   Results for orders placed or performed during the hospital encounter of 12/04/18   EEG awake or drowsy routine    Narrative ELECTROENCEPHALOGRAM (EEG)    Patient Name:  Oren Mclean  MRN: [id number]    :  1995 File #: [facility]    Age: 26 y o  Begin Date/Time: 2018  3:21 PM       Report date: 2018          Study type: Routine EEG     ICD 10 diagnosis: Spells/Fit NOS R56 9    Start time: 1412  End time: 1504    ---------------------------------------------------------------------------  ----------------------------------------   Patient History:    26year old male who is having a routine EEG following a witnessed episode   of seizure-like activity on 18  Patient reports that there was   generalized shaking, his eyes rolled back, and the entire event lasted 90   seconds to 2 minutes  Denies LOC, tongue bite, or incontinence  Prior to   the event he states that he was in his usual state of health  Patient states that post-ictally he felt tired and fell asleep 20 minutes   later  No known family history of seizures  Maternal grandmother had Alzheimer's  This recording was observed in a 26 y o  male to determine whether spells   of are seizures  Medications include:     Prior to Admission medications    Medication Sig Start Date Authorizing Provider   LORazepam (ATIVAN) 0 5 mg tablet Take 1 tablet 3 times daily as needed for   anxiety or panic 12/3/18 Jamar Schmid DO   PARoxetine (PAXIL) 20 mg tablet Take 1 tablet (20 mg total) by mouth daily   18 Jamar Schmid DO   ---------------------------------------------------------------------------  ----------------------------------------   Description of Procedure:    32 channel digital recording with electrodes placed according to the   International 10-20 system with additional T1/T2 electrodes, EOG, EKG, and   simultaneous video  The recording was technically satisfactory      ---------------------------------------------------------------------------  ----------------------------------------   EEG Description:    The recording was performed with the patient awake, drowsy,  He was   oriented to person , place and time     During wakefulness, the background is symmetric with a well-organized, low   amplitude beta activity is present anteriorly and low-moderate amplitude   alpha activity posteriorly  There is a symmetric 9 to 10  Hz posterior   dominant rhythm that attenuates with eye opening  Drowsiness is characterized by attenuation of the alpha rhythm, central   theta activity, presence of vertex waves    ---------------------------------------------------------------------------  ----------------------------------------   Activation Procedures:    Hyperventilation was performed for 3-4 minutes and did not produce any   changes  Stepped photic stimulation between 1-20 cps was performed and induced   symmetric photic driving  Other findings: The single lead ECG demonstrated normal sinus rhythm     ---------------------------------------------------------------------------  ----------------------------------------   EEG Interpretation: This Routine EEG recorded during wakefulness, drowsiness,  Is normal     Joann Smith DO   [address] [facility] [place]       Results for orders placed or performed during the hospital encounter of 12/03/18   CT head wo contrast    Narrative    CT BRAIN - WITHOUT CONTRAST    INDICATION:   R41 3: Other amnesia  R40 20: Unspecified coma  COMPARISON:  5/2/2014  TECHNIQUE:  CT examination of the brain was performed  In addition to axial images, coronal 2D reformatted images were created and submitted for interpretation  Radiation dose length product (DLP) for this visit:  1011 mGy-cm   This examination, like all CT scans performed in the [facility], was performed utilizing techniques to minimize radiation dose exposure, including the use of iterative   reconstruction and automated exposure control        IMAGE QUALITY: Diagnostic  FINDINGS:    PARENCHYMA:  No intracranial mass, mass effect or midline shift  No CT signs of acute infarction  No acute parenchymal hemorrhage  VENTRICLES AND EXTRA-AXIAL SPACES:  Normal for the patient's age  VISUALIZED ORBITS AND PARANASAL SINUSES:  Unremarkable  CALVARIUM AND EXTRACRANIAL SOFT TISSUES:  Normal       Impression    No acute intracranial abnormality

## 2018-12-10 NOTE — ASSESSMENT & PLAN NOTE
Patient's CT scan, blood work, EEG are all negative  Per Neurology consultation earlier today, no seizure  Patient now has complete memory of the incident    Per Neurology he believes this was a panic or anxiety related symptomatology

## 2018-12-10 NOTE — PATIENT INSTRUCTIONS

## 2019-02-05 PROBLEM — R40.4 ALTERATION OF CONSCIOUSNESS: Status: ACTIVE | Noted: 2019-01-20

## 2019-02-05 PROBLEM — F44.5 PSEUDOSEIZURES: Status: ACTIVE | Noted: 2019-01-21

## 2019-02-05 PROBLEM — F41.0 PANIC DISORDER WITHOUT AGORAPHOBIA WITH SEVERE PANIC ATTACKS: Status: ACTIVE | Noted: 2018-12-12

## 2019-02-05 PROBLEM — R56.9 PSEUDOSEIZURES (HCC): Status: ACTIVE | Noted: 2019-01-21

## 2019-02-05 RX ORDER — PAROXETINE 30 MG/1
30 TABLET, FILM COATED ORAL DAILY
Refills: 0 | COMMUNITY
Start: 2019-01-25 | End: 2019-11-19 | Stop reason: ALTCHOICE

## 2019-02-05 RX ORDER — LEVETIRACETAM 500 MG/1
1000 TABLET ORAL 2 TIMES DAILY
Refills: 0 | COMMUNITY
Start: 2019-01-20 | End: 2019-02-21 | Stop reason: ALTCHOICE

## 2019-02-05 RX ORDER — LEVETIRACETAM 750 MG/1
750 TABLET ORAL 2 TIMES DAILY
Refills: 0 | COMMUNITY
Start: 2019-01-16 | End: 2019-02-21 | Stop reason: ALTCHOICE

## 2019-02-21 ENCOUNTER — OFFICE VISIT (OUTPATIENT)
Dept: FAMILY MEDICINE CLINIC | Facility: CLINIC | Age: 24
End: 2019-02-21
Payer: COMMERCIAL

## 2019-02-21 VITALS
SYSTOLIC BLOOD PRESSURE: 132 MMHG | HEART RATE: 88 BPM | HEIGHT: 73 IN | DIASTOLIC BLOOD PRESSURE: 60 MMHG | BODY MASS INDEX: 30.35 KG/M2 | WEIGHT: 229 LBS

## 2019-02-21 DIAGNOSIS — R40.4 TRANSIENT ALTERATION OF AWARENESS: Primary | ICD-10-CM

## 2019-02-21 DIAGNOSIS — F32.A ANXIETY AND DEPRESSION: ICD-10-CM

## 2019-02-21 DIAGNOSIS — F32.9 MAJOR DEPRESSIVE DISORDER, SINGLE EPISODE WITH ANXIOUS DISTRESS: ICD-10-CM

## 2019-02-21 DIAGNOSIS — F41.9 ANXIETY AND DEPRESSION: ICD-10-CM

## 2019-02-21 DIAGNOSIS — R56.9 PSEUDOSEIZURES (HCC): ICD-10-CM

## 2019-02-21 DIAGNOSIS — E66.9 CLASS 1 OBESITY WITHOUT SERIOUS COMORBIDITY WITH BODY MASS INDEX (BMI) OF 31.0 TO 31.9 IN ADULT, UNSPECIFIED OBESITY TYPE: ICD-10-CM

## 2019-02-21 DIAGNOSIS — E66.9 OBESITY (BMI 30.0-34.9): ICD-10-CM

## 2019-02-21 PROBLEM — F41.0 PANIC ATTACK: Status: RESOLVED | Noted: 2018-12-03 | Resolved: 2019-02-21

## 2019-02-21 PROBLEM — S96.101S: Status: RESOLVED | Noted: 2018-09-26 | Resolved: 2019-02-21

## 2019-02-21 PROCEDURE — 99214 OFFICE O/P EST MOD 30 MIN: CPT | Performed by: FAMILY MEDICINE

## 2019-02-21 PROCEDURE — 3008F BODY MASS INDEX DOCD: CPT | Performed by: FAMILY MEDICINE

## 2019-02-21 PROCEDURE — 1036F TOBACCO NON-USER: CPT | Performed by: FAMILY MEDICINE

## 2019-02-21 NOTE — PATIENT INSTRUCTIONS
Follow-up with Neurology and Psychiatry  Patient may drive when cleared by Neurology    Weight loss recommended recheck 4 months, sooner if need

## 2019-02-21 NOTE — PROGRESS NOTES
Assessment and Plan:  1  Transient alteration of awareness/pseudo seizure, patient is to follow up with [facility] Neurology patient has appointment next week  Patient instructed not to drive until cleared by Neurology  2  Anxiety/depression/panic disorder, patient follows with Psychiatry, Dr Loni Kanner  3  Obesity diet exercise and weight loss discussed BMI is 30 I dL is 25 or less  4  Return to office in 4 months for recheck, sooner if needed         Problem List Items Addressed This Visit        Other    Anxiety and depression     Psychiatry, Dr Loni Kanner         Major depressive disorder, single episode with anxious distress     Follow-up with Psychiatry         Transient alteration of awareness - Primary    Obesity    Pseudoseizures      Other Visit Diagnoses     Obesity (BMI 30 0-34  9)                 Diagnoses and all orders for this visit:    Transient alteration of awareness    Anxiety and depression    Pseudoseizures    Class 1 obesity without serious comorbidity with body mass index (BMI) of 31 0 to 31 9 in adult, unspecified obesity type    Obesity (BMI 30 0-34  9)    Major depressive disorder, single episode with anxious distress              Subjective:      Patient ID: Kendall Vela is a 26 y o  male  CC:    Chief Complaint   Patient presents with    Follow-up     seizures   mjs    HPI:    Last month patient was admitted to [facility] patient was in the neuro ICU with 24 hour EEG  EEGs were negative  Patient's diagnosed with pseudoseizures  And anxiety  Patient has an appointment with Neurology next week in follows with Psychiatry  Patient's Keppra was stopped by Neurology in hospital   Patient has had no seizure-like activity since hospital discharge  Patient has appointment next week with Neurology and see Psychiatry every 2 months    Discharge summary from [facility] was discussed with patient      The following portions of the patient's history were reviewed and updated as appropriate: allergies, current medications, past family history, past medical history, past social history, past surgical history and problem list       Review of Systems   Constitutional: Negative  HENT: Negative  Eyes: Negative  Respiratory: Negative  Cardiovascular: Negative  Gastrointestinal: Negative  Endocrine: Negative  Genitourinary: Negative  Musculoskeletal: Negative  Skin: Negative  Allergic/Immunologic: Negative  Neurological:        HPI   Hematological: Negative  Psychiatric/Behavioral:        HPI         Data to review:       Objective:    Vitals:    02/21/19 1555   BP: 132/60   Pulse: 88   Weight: 104 kg (229 lb)   Height: 6' 1" (1 854 m)        Physical Exam   Constitutional: He is oriented to person, place, and time  He appears well-developed and well-nourished  HENT:   Head: Normocephalic and atraumatic  Right Ear: External ear normal    Left Ear: External ear normal    Nose: Nose normal    Mouth/Throat: Oropharynx is clear and moist  No oropharyngeal exudate  Eyes: Pupils are equal, round, and reactive to light  EOM are normal  No scleral icterus  Neck: Normal range of motion  Neck supple  No thyromegaly present  Cardiovascular: Normal rate, regular rhythm and normal heart sounds  Pulmonary/Chest: Effort normal and breath sounds normal    Abdominal: Soft  Bowel sounds are normal  There is no tenderness  Musculoskeletal: He exhibits no edema  Lymphadenopathy:     He has no cervical adenopathy  Neurological: He is alert and oriented to person, place, and time  No cranial nerve deficit  Skin: Skin is warm and dry  Psychiatric: He has a normal mood and affect  BMI Counseling: Body mass index is 30 21 kg/m²  Discussed the patient's BMI with him  The BMI is above average  BMI counseling and education was provided to the patient  Nutrition recommendations include decreasing overall calorie intake   Exercise recommendations include exercising 3-5 times per week

## 2019-09-10 ENCOUNTER — CLINICAL SUPPORT (OUTPATIENT)
Dept: FAMILY MEDICINE CLINIC | Facility: CLINIC | Age: 24
End: 2019-09-10
Payer: COMMERCIAL

## 2019-09-10 DIAGNOSIS — Z11.1 SCREENING FOR TUBERCULOSIS: Primary | ICD-10-CM

## 2019-09-10 PROCEDURE — 86580 TB INTRADERMAL TEST: CPT | Performed by: FAMILY MEDICINE

## 2019-09-10 NOTE — PROGRESS NOTES
Pt presents for TB screening for employment  Administered PPD 0 1ml intradermally to (R) forearm without difficulty  Pt is aware he needs to return in 48-72 hrs for reading  He left the office in no distress  --bb

## 2019-09-12 ENCOUNTER — CLINICAL SUPPORT (OUTPATIENT)
Dept: FAMILY MEDICINE CLINIC | Facility: CLINIC | Age: 24
End: 2019-09-12

## 2019-09-12 DIAGNOSIS — Z11.1 ENCOUNTER FOR READING OF TUBERCULIN SKIN TEST: Primary | ICD-10-CM

## 2019-09-12 LAB
INDURATION: 0 MM
TB SKIN TEST: NEGATIVE

## 2019-09-12 NOTE — PROGRESS NOTES
Pt presents for ppd reading  Results read as negative on (R) forearm at 1502  Pt provided with proof of testing and he left the office in no distress  --bb

## 2019-11-19 ENCOUNTER — OFFICE VISIT (OUTPATIENT)
Dept: FAMILY MEDICINE CLINIC | Facility: CLINIC | Age: 24
End: 2019-11-19
Payer: COMMERCIAL

## 2019-11-19 ENCOUNTER — TELEPHONE (OUTPATIENT)
Dept: OTHER | Facility: OTHER | Age: 24
End: 2019-11-19

## 2019-11-19 VITALS
DIASTOLIC BLOOD PRESSURE: 78 MMHG | HEIGHT: 73 IN | BODY MASS INDEX: 29.46 KG/M2 | TEMPERATURE: 98.5 F | WEIGHT: 222.3 LBS | SYSTOLIC BLOOD PRESSURE: 116 MMHG | HEART RATE: 72 BPM

## 2019-11-19 DIAGNOSIS — R10.9 ABDOMINAL CRAMPING: ICD-10-CM

## 2019-11-19 DIAGNOSIS — K52.9 GASTROENTERITIS: Primary | ICD-10-CM

## 2019-11-19 DIAGNOSIS — R19.7 DIARRHEA, UNSPECIFIED TYPE: ICD-10-CM

## 2019-11-19 DIAGNOSIS — R11.2 NAUSEA AND VOMITING, INTRACTABILITY OF VOMITING NOT SPECIFIED, UNSPECIFIED VOMITING TYPE: ICD-10-CM

## 2019-11-19 DIAGNOSIS — S19.80XD BLUNT TRAUMA OF NECK, SUBSEQUENT ENCOUNTER: ICD-10-CM

## 2019-11-19 DIAGNOSIS — G83.9 PARALYSIS (HCC): ICD-10-CM

## 2019-11-19 PROCEDURE — 99214 OFFICE O/P EST MOD 30 MIN: CPT | Performed by: FAMILY MEDICINE

## 2019-11-19 PROCEDURE — 1036F TOBACCO NON-USER: CPT | Performed by: FAMILY MEDICINE

## 2019-11-19 RX ORDER — ONDANSETRON 8 MG/1
8 TABLET, ORALLY DISINTEGRATING ORAL EVERY 8 HOURS PRN
Qty: 10 TABLET | Refills: 0 | Status: SHIPPED | OUTPATIENT
Start: 2019-11-19 | End: 2020-01-21 | Stop reason: SDUPTHER

## 2019-11-19 RX ORDER — DIPHENOXYLATE HYDROCHLORIDE AND ATROPINE SULFATE 2.5; .025 MG/1; MG/1
1 TABLET ORAL 4 TIMES DAILY PRN
Qty: 10 TABLET | Refills: 0 | Status: SHIPPED | OUTPATIENT
Start: 2019-11-19 | End: 2019-11-26

## 2019-11-19 NOTE — LETTER
November 19, 2019     Patient: Tres Knight   YOB: 1995   Date of Visit: 11/19/2019       To Whom it May Concern:    Anuja Marmolejo is under my professional care  He was seen in my office on 11/19/2019  He may return to work on  11/21/2019  If you have any questions or concerns, please don't hesitate to call           Sincerely,          Jamar Schmid DO        CC: No Recipients
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
## 2019-11-19 NOTE — PATIENT INSTRUCTIONS
Clear liquid diet for 24 hours period especially have strength Gatorade  Advance as tolerated  When oral intake is tolerate may use the brat diet for diarrhea  Patient to return in 48 hours still with symptoms    If worsen report to [facility] Emergency Department

## 2020-01-21 ENCOUNTER — OFFICE VISIT (OUTPATIENT)
Dept: FAMILY MEDICINE CLINIC | Facility: CLINIC | Age: 25
End: 2020-01-21
Payer: COMMERCIAL

## 2020-01-21 VITALS
HEIGHT: 73 IN | WEIGHT: 234 LBS | BODY MASS INDEX: 31.01 KG/M2 | TEMPERATURE: 98.4 F | DIASTOLIC BLOOD PRESSURE: 62 MMHG | HEART RATE: 88 BPM | SYSTOLIC BLOOD PRESSURE: 120 MMHG

## 2020-01-21 DIAGNOSIS — A08.4 VIRAL GASTROENTERITIS: ICD-10-CM

## 2020-01-21 DIAGNOSIS — R11.2 NAUSEA AND VOMITING, INTRACTABILITY OF VOMITING NOT SPECIFIED, UNSPECIFIED VOMITING TYPE: ICD-10-CM

## 2020-01-21 DIAGNOSIS — K52.9 GASTROENTERITIS: ICD-10-CM

## 2020-01-21 DIAGNOSIS — R76.8 HSV-2 SEROPOSITIVE: ICD-10-CM

## 2020-01-21 DIAGNOSIS — Z11.4 SCREENING FOR HIV (HUMAN IMMUNODEFICIENCY VIRUS): Primary | ICD-10-CM

## 2020-01-21 PROCEDURE — 99214 OFFICE O/P EST MOD 30 MIN: CPT | Performed by: PHYSICIAN ASSISTANT

## 2020-01-21 PROCEDURE — 3008F BODY MASS INDEX DOCD: CPT | Performed by: PHYSICIAN ASSISTANT

## 2020-01-21 RX ORDER — ONDANSETRON 8 MG/1
8 TABLET, ORALLY DISINTEGRATING ORAL EVERY 8 HOURS PRN
Qty: 10 TABLET | Refills: 0 | Status: SHIPPED | OUTPATIENT
Start: 2020-01-21 | End: 2020-10-20 | Stop reason: ALTCHOICE

## 2020-01-21 NOTE — ASSESSMENT & PLAN NOTE
Urgent care [facility] did STD testing except not HIV  Patient should go for this as is HSV 2 is positive  Patient has never had an outbreak of herpes in the past he is aware of  He does usually wear condoms for protection

## 2020-01-21 NOTE — LETTER
January 21, 2020     Patient: Pierre Bundy   YOB: 1995   Date of Visit: 1/21/2020       To Whom it May Concern:    Binh Sawant is under my professional care  He was seen in my office on 1/21/2020  He may return to work on 1/23/2020  Viral gastroenteritis       If you have any questions or concerns, please don't hesitate to call           Sincerely,          Larissa Ybarra, PA-C        CC: No Recipients

## 2020-01-21 NOTE — PATIENT INSTRUCTIONS
Problem List Items Addressed This Visit        Digestive    Viral gastroenteritis     Brat diet, increase fluids food as tolerated but avoid dairy  Note for work given for the next 2 days  Zofran as needed for nausea  Ammonium over-the-counter as needed as directed  Other    HSV-2 seropositive     Urgent care [facility] did STD testing except not HIV  Patient should go for this as is HSV 2 is positive  Patient has never had an outbreak of herpes in the past he is aware of  He does usually wear condoms for protection             Other Visit Diagnoses     Screening for HIV (human immunodeficiency virus)    -  Primary    Relevant Orders    [facility] HIV 1/2 AG-AB combo    Gastroenteritis        Relevant Medications    ondansetron (ZOFRAN-ODT) 8 mg disintegrating tablet    Nausea and vomiting, intractability of vomiting not specified, unspecified vomiting type        Relevant Medications    ondansetron (ZOFRAN-ODT) 8 mg disintegrating tablet

## 2020-01-21 NOTE — PROGRESS NOTES
Assessment and Plan:    Problem List Items Addressed This Visit        Digestive    Viral gastroenteritis     Brat diet, increase fluids food as tolerated but avoid dairy  Note for work given for the next 2 days  Zofran as needed for nausea  Ammonium over-the-counter as needed as directed  Other    HSV-2 seropositive     Urgent care [facility] did STD testing except not HIV  Patient should go for this as is HSV 2 is positive  Patient has never had an outbreak of herpes in the past he is aware of  He does usually wear condoms for protection  Other Visit Diagnoses     Screening for HIV (human immunodeficiency virus)    -  Primary    Relevant Orders    [facility] HIV 1/2 AG-AB combo    Gastroenteritis        Relevant Medications    ondansetron (ZOFRAN-ODT) 8 mg disintegrating tablet    Nausea and vomiting, intractability of vomiting not specified, unspecified vomiting type        Relevant Medications    ondansetron (ZOFRAN-ODT) 8 mg disintegrating tablet                 Diagnoses and all orders for this visit:    Screening for HIV (human immunodeficiency virus)  -     [facility] HIV 1/2 AG-AB combo; Future    Viral gastroenteritis    Gastroenteritis  -     ondansetron (ZOFRAN-ODT) 8 mg disintegrating tablet; Take 1 tablet (8 mg total) by mouth every 8 (eight) hours as needed for nausea or vomiting for up to 3 days    Nausea and vomiting, intractability of vomiting not specified, unspecified vomiting type  -     ondansetron (ZOFRAN-ODT) 8 mg disintegrating tablet; Take 1 tablet (8 mg total) by mouth every 8 (eight) hours as needed for nausea or vomiting for up to 3 days    HSV-2 seropositive              Subjective:      Patient ID: Fredrich Fabry is a 22 y o  male  CC:    Chief Complaint   Patient presents with    GI Problem     patient c/o vomiting and diarrhea starting last night  Patient had powerade today and nothing to eat   ak       HPI:    Patient states he woke up at 2:00 a m  This morning with nausea vomiting and diarrhea at the same time  He also had some hot flashes and sweating  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  He has a 1year-old who has no symptoms at this time  He did have the same thing happen in November and was seen here and given Zofran for nausea and lomotil  Patient states he ended up taking over-the-counter Imodium instead  He states he is likely not able to go to school tomorrow and of course needs a note for today as well  We discussed HIV and patient states he had STD testing at the urgent care  When looking through these results at [facility] they did not check for HIV but did check for GC chlamydia and H SV which came out positive for type 2  Patient has never had an outbreak and his lifetime that he is aware of  The following portions of the patient's history were reviewed and updated as appropriate: allergies, current medications, past family history, past medical history, past social history, past surgical history and problem list       Review of Systems   Constitutional: Positive for fatigue and fever  HENT: Negative  Eyes: Negative  Respiratory: Negative  Cardiovascular: Negative  Gastrointestinal: Positive for diarrhea, nausea and vomiting  Endocrine: Negative  Genitourinary: Negative  Musculoskeletal: Negative  Skin: Negative  Allergic/Immunologic: Negative  Neurological: Negative  Hematological: Negative  Psychiatric/Behavioral: Negative  Data to review:       Objective:    Vitals:    01/21/20 1550   BP: 120/62   Pulse: 88   Temp: 98 4 °F (36 9 °C)   Weight: 106 kg (234 lb)   Height: 6' 1" (1 854 m)        Physical Exam   Constitutional: He is oriented to person, place, and time  He appears well-developed and well-nourished  No distress  HENT:   Head: Normocephalic and atraumatic  Eyes: Conjunctivae are normal  Right eye exhibits no discharge   Left eye exhibits no discharge  Neck: Carotid bruit is not present  Cardiovascular: Normal rate, regular rhythm and normal heart sounds  Exam reveals no gallop and no friction rub  No murmur heard  Pulmonary/Chest: Effort normal and breath sounds normal  No respiratory distress  He has no wheezes  He has no rales  Abdominal: Soft  Normal appearance  He exhibits no shifting dullness, no distension, no pulsatile liver, no fluid wave, no abdominal bruit, no ascites, no pulsatile midline mass and no mass  Bowel sounds are increased  There is generalized tenderness  There is no rigidity, no rebound, no guarding, no CVA tenderness, no tenderness at McBurney's point and negative Hicks's sign  Neurological: He is alert and oriented to person, place, and time  Skin: Skin is warm and dry  He is not diaphoretic  Psychiatric: He has a normal mood and affect  Judgment normal    Nursing note and vitals reviewed

## 2020-01-21 NOTE — ASSESSMENT & PLAN NOTE
Brat diet, increase fluids food as tolerated but avoid dairy  Note for work given for the next 2 days  Zofran as needed for nausea  Ammonium over-the-counter as needed as directed

## 2020-01-28 ENCOUNTER — OFFICE VISIT (OUTPATIENT)
Dept: FAMILY MEDICINE CLINIC | Facility: CLINIC | Age: 25
End: 2020-01-28
Payer: COMMERCIAL

## 2020-01-28 VITALS
WEIGHT: 243.4 LBS | HEART RATE: 90 BPM | SYSTOLIC BLOOD PRESSURE: 130 MMHG | BODY MASS INDEX: 32.26 KG/M2 | HEIGHT: 73 IN | OXYGEN SATURATION: 97 % | TEMPERATURE: 97.8 F | RESPIRATION RATE: 18 BRPM | DIASTOLIC BLOOD PRESSURE: 82 MMHG

## 2020-01-28 DIAGNOSIS — F41.1 GENERALIZED ANXIETY DISORDER: ICD-10-CM

## 2020-01-28 DIAGNOSIS — F41.0 PANIC DISORDER WITHOUT AGORAPHOBIA WITH SEVERE PANIC ATTACKS: ICD-10-CM

## 2020-01-28 DIAGNOSIS — F90.9 ADULT ADHD: Primary | ICD-10-CM

## 2020-01-28 DIAGNOSIS — T50.905D ADVERSE EFFECT OF DRUG, SUBSEQUENT ENCOUNTER: ICD-10-CM

## 2020-01-28 DIAGNOSIS — F32.9 MAJOR DEPRESSIVE DISORDER, SINGLE EPISODE WITH ANXIOUS DISTRESS: ICD-10-CM

## 2020-01-28 DIAGNOSIS — E66.9 CLASS 1 OBESITY WITHOUT SERIOUS COMORBIDITY WITH BODY MASS INDEX (BMI) OF 31.0 TO 31.9 IN ADULT, UNSPECIFIED OBESITY TYPE: ICD-10-CM

## 2020-01-28 PROBLEM — F32.A ANXIETY AND DEPRESSION: Status: RESOLVED | Noted: 2018-09-26 | Resolved: 2020-01-28

## 2020-01-28 PROBLEM — F41.9 ANXIETY AND DEPRESSION: Status: RESOLVED | Noted: 2018-09-26 | Resolved: 2020-01-28

## 2020-01-28 PROBLEM — A08.4 VIRAL GASTROENTERITIS: Status: RESOLVED | Noted: 2020-01-21 | Resolved: 2020-01-28

## 2020-01-28 PROCEDURE — 99214 OFFICE O/P EST MOD 30 MIN: CPT | Performed by: FAMILY MEDICINE

## 2020-01-28 PROCEDURE — 1036F TOBACCO NON-USER: CPT | Performed by: FAMILY MEDICINE

## 2020-01-28 RX ORDER — BUPROPION HYDROCHLORIDE 150 MG/1
150 TABLET ORAL EVERY MORNING
Qty: 30 TABLET | Refills: 2 | Status: SHIPPED | OUTPATIENT
Start: 2020-01-28 | End: 2020-12-07

## 2020-01-28 NOTE — PROGRESS NOTES
Assessment and Plan:  1  ADHD, discussed with patient will start Wellbutrin refer to Psychiatry  2  MDD/MICHELLE in light of a low will start Wellbutrin refer to Psychiatry, the [address] [place] [address] Dr clinic  3  Adverse drug reaction patient had a reaction to Paxil with doses higher 20 increased suicidal ideation     Discussed Wellbutrin  Any medication can cause this  Risk and benefit of Wellbutrin discussed  If patient develops any suicidal ideation patient should stop immediately call my office or report to [facility] Emergency Department  4  BMI 32 11 discussed diet exercise and weight loss recommended  5  Return after psychiatric evaluation if needed        Problem List Items Addressed This Visit        Other    Major depressive disorder, single episode with anxious distress    Relevant Medications    buPROPion (WELLBUTRIN XL) 150 mg 24 hr tablet    Other Relevant Orders    Ambulatory referral to Psychiatry    Obesity     Patient gained 9 lb diet exercise and weight loss recommended         Panic disorder without agoraphobia with severe panic attacks     Refer to Psychiatry         Relevant Medications    buPROPion (WELLBUTRIN XL) 150 mg 24 hr tablet    Other Relevant Orders    Ambulatory referral to Psychiatry    Adult ADHD - Primary     Will start Wellbutrin refer to Psychiatry         Relevant Medications    buPROPion (WELLBUTRIN XL) 150 mg 24 hr tablet    Other Relevant Orders    Ambulatory referral to Psychiatry    Generalized anxiety disorder     Refer to Psychiatry         Relevant Medications    buPROPion (WELLBUTRIN XL) 150 mg 24 hr tablet    Other Relevant Orders    Ambulatory referral to Psychiatry      Other Visit Diagnoses     Adverse effect of drug, subsequent encounter                     Diagnoses and all orders for this visit:    Adult ADHD  -     buPROPion (WELLBUTRIN XL) 150 mg 24 hr tablet;  Take 1 tablet (150 mg total) by mouth every morning  -     Ambulatory referral to Psychiatry; Future    Major depressive disorder, single episode with anxious distress  -     buPROPion (WELLBUTRIN XL) 150 mg 24 hr tablet; Take 1 tablet (150 mg total) by mouth every morning  -     Ambulatory referral to Psychiatry; Future    Panic disorder without agoraphobia with severe panic attacks  -     buPROPion (WELLBUTRIN XL) 150 mg 24 hr tablet; Take 1 tablet (150 mg total) by mouth every morning  -     Ambulatory referral to Psychiatry; Future    Class 1 obesity without serious comorbidity with body mass index (BMI) of 31 0 to 31 9 in adult, unspecified obesity type    Adverse effect of drug, subsequent encounter    Generalized anxiety disorder  -     buPROPion (WELLBUTRIN XL) 150 mg 24 hr tablet; Take 1 tablet (150 mg total) by mouth every morning  -     Ambulatory referral to Psychiatry; Future              Subjective:      Patient ID: Nash Mendoza is a 22 y o  male  CC:    Chief Complaint   Patient presents with    Follow-up     pt would like to get back on his ADHD and anxiety medications       HPI:    Patient no longer seeing psychiatrist   Using psychologist   In the past patient was placed on Paxil for his anxiety depression in was increased to 40 mg daily he said at 20 his symptoms were not fully resolved however it 48 suicidal ideation to stop did  Patient like to see psychiatrist in addition patient is having issues with his ADHD he had in the past as a child  Wellbutrin can be used for ADHD, anxiety, depression  We will start him on Wellbutrin as eyes is in a different class of Paxil  And refer to Psychiatry patient is in agreement  Patient gained 9 lb since last office visit    Negative suicidal homicidal ideations      The following portions of the patient's history were reviewed and updated as appropriate: allergies, current medications, past family history, past medical history, past social history, past surgical history and problem list       Review of Systems   Constitutional:        HPI HENT: Negative  Eyes: Negative  Respiratory: Negative  Cardiovascular: Negative  Gastrointestinal: Negative  Endocrine: Negative  Musculoskeletal: Negative  Skin: Negative  Allergic/Immunologic: Negative  Neurological: Negative  Hematological: Negative  Psychiatric/Behavioral:        HPI         Data to review:       Objective:    Vitals:    01/28/20 1546 01/28/20 1554   BP: 140/82 130/82   BP Location: Left arm    Patient Position: Sitting    Cuff Size: Adult    Pulse: 90    Resp: 18    Temp: 97 8 °F (36 6 °C)    TempSrc: Temporal    SpO2: 97%    Weight: 110 kg (243 lb 6 4 oz)    Height: 6' 1" (1 854 m)         Physical Exam   Constitutional: He is oriented to person, place, and time  He appears well-developed and well-nourished  No distress  HENT:   Head: Normocephalic and atraumatic  Mouth/Throat: Oropharynx is clear and moist    Eyes: No scleral icterus  Neck: Neck supple  No JVD present  Cardiovascular: Normal rate, regular rhythm and normal heart sounds  Pulmonary/Chest: Effort normal and breath sounds normal    Abdominal: Soft  Bowel sounds are normal  There is no tenderness  Musculoskeletal: He exhibits no edema  Neurological: He is alert and oriented to person, place, and time  No cranial nerve deficit  Skin: Skin is warm and dry  He is not diaphoretic

## 2020-01-28 NOTE — PATIENT INSTRUCTIONS
Start Wellbutrin 1 tablet daily  Follow-up with Psychiatry at the [facility] in [place]  Diet exercise and weight loss recommended    Return after psychiatric evaluation if needed

## 2020-04-06 ENCOUNTER — TELEPHONE (OUTPATIENT)
Dept: PSYCHIATRY | Facility: CLINIC | Age: 25
End: 2020-04-06

## 2020-06-12 ENCOUNTER — TELEMEDICINE (OUTPATIENT)
Dept: FAMILY MEDICINE CLINIC | Facility: CLINIC | Age: 25
End: 2020-06-12
Payer: COMMERCIAL

## 2020-06-12 VITALS — BODY MASS INDEX: 34.46 KG/M2 | HEIGHT: 73 IN | TEMPERATURE: 97 F | WEIGHT: 260 LBS

## 2020-06-12 DIAGNOSIS — J01.90 ACUTE NON-RECURRENT SINUSITIS, UNSPECIFIED LOCATION: Primary | ICD-10-CM

## 2020-06-12 PROCEDURE — 99215 OFFICE O/P EST HI 40 MIN: CPT | Performed by: FAMILY MEDICINE

## 2020-06-12 PROCEDURE — 3008F BODY MASS INDEX DOCD: CPT | Performed by: FAMILY MEDICINE

## 2020-06-12 RX ORDER — DOXYCYCLINE 100 MG/1
100 CAPSULE ORAL
Qty: 14 CAPSULE | Refills: 0 | Status: SHIPPED | OUTPATIENT
Start: 2020-06-12 | End: 2020-07-12

## 2020-09-08 ENCOUNTER — OFFICE VISIT (OUTPATIENT)
Dept: FAMILY MEDICINE CLINIC | Facility: CLINIC | Age: 25
End: 2020-09-08
Payer: COMMERCIAL

## 2020-09-08 VITALS
HEIGHT: 73 IN | HEART RATE: 72 BPM | DIASTOLIC BLOOD PRESSURE: 84 MMHG | TEMPERATURE: 97.5 F | SYSTOLIC BLOOD PRESSURE: 138 MMHG | BODY MASS INDEX: 34.11 KG/M2 | WEIGHT: 257.38 LBS | RESPIRATION RATE: 20 BRPM

## 2020-09-08 DIAGNOSIS — Z11.1 ENCOUNTER FOR PPD TEST: Primary | ICD-10-CM

## 2020-09-08 DIAGNOSIS — Z00.00 HEALTH CARE MAINTENANCE: ICD-10-CM

## 2020-09-08 DIAGNOSIS — R76.8 HSV-2 SEROPOSITIVE: ICD-10-CM

## 2020-09-08 PROCEDURE — 86580 TB INTRADERMAL TEST: CPT | Performed by: PHYSICIAN ASSISTANT

## 2020-09-08 PROCEDURE — 99395 PREV VISIT EST AGE 18-39: CPT | Performed by: PHYSICIAN ASSISTANT

## 2020-09-08 RX ORDER — VALACYCLOVIR HYDROCHLORIDE 1 G/1
TABLET, FILM COATED ORAL
Qty: 30 TABLET | Refills: 5 | Status: SHIPPED | OUTPATIENT
Start: 2020-09-08 | End: 2021-01-11 | Stop reason: SDUPTHER

## 2020-09-08 NOTE — PATIENT INSTRUCTIONS
Assessment/plan:  1  Healthcare maintenance-healthy appearing 69-year-old gentleman  Tuberculosis PPD testing applied today  We discussed HPV vaccine today  Patient will consider  Patient will have PPD read in 48-72 hours  Physical form was completed and will be held in the red nursing folder until completion of his PPD  2  HSV 2-treatment options discussed with patient  He is interested in suppressive therapy and we will start Valtrex 1 g daily  Medication sent to the pharmacy  Encouraged use of condom  Advised that transmission is still possible but much less likely with the medication  3  Anxiety-presently stable on Wellbutrin

## 2020-09-08 NOTE — PROGRESS NOTES
Assessment and Plan:  Patient Instructions   Assessment/plan:  1  Healthcare maintenance-healthy appearing 6-year-old gentleman  Tuberculosis PPD testing applied today  We discussed HPV vaccine today  Patient will consider  Patient will have PPD read in 48-72 hours  Physical form was completed and will be held in the red nursing folder until completion of his PPD  2  HSV 2-treatment options discussed with patient  He is interested in suppressive therapy and we will start Valtrex 1 g daily  Medication sent to the pharmacy  Encouraged use of condom  Advised that transmission is still possible but much less likely with the medication  3  Anxiety-presently stable on Wellbutrin  Problem List Items Addressed This Visit        Other    Health care maintenance    HSV-2 seropositive    Relevant Medications    valACYclovir (VALTREX) 1,000 mg tablet      Other Visit Diagnoses     Encounter for PPD test    -  Primary    Relevant Orders    TB Skin Test                 Diagnoses and all orders for this visit:    Encounter for PPD test  -     TB Skin Test    Health care maintenance    HSV-2 seropositive  -     valACYclovir (VALTREX) 1,000 mg tablet; Take 1 tab daily              Subjective:      Patient ID: Kieran Zavala is a 26 y o  male  CC:    Chief Complaint   Patient presents with    Physical Exam     pt is a  for [employer]   mgb       HPI:    HPI:  This is a 6-year-old gentleman that presents to the office for health maintenance physical   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 [employer]  He is needed to get a tuberculosis screening  Rest of vaccines are up-to-date  Patient reports that he also had a blood test sometime ago which showed that he was positive for HSV 2  He would like to discuss suppressive options        The following portions of the patient's history were reviewed and updated as appropriate: allergies, current medications, past family history, past medical history, past social history, past surgical history and problem list       Review of Systems   Constitutional: Negative for chills, fatigue and fever  HENT: Negative for congestion, ear pain and sinus pressure  Eyes: Negative for visual disturbance  Respiratory: Negative for cough, chest tightness and shortness of breath  Cardiovascular: Negative for chest pain and palpitations  Gastrointestinal: Negative for diarrhea, nausea and vomiting  Endocrine: Negative for polyuria  Genitourinary: Negative for dysuria and frequency  Musculoskeletal: Negative for arthralgias and myalgias  Skin: Negative for pallor and rash  Neurological: Negative for dizziness, weakness, light-headedness, numbness and headaches  Psychiatric/Behavioral: Negative for agitation, behavioral problems and sleep disturbance  All other systems reviewed and are negative  Data to review:       Objective:    Vitals:    09/08/20 1359   BP: 138/84   BP Location: Left arm   Patient Position: Sitting   Cuff Size: Large   Pulse: 72   Resp: 20   Temp: 97 5 °F (36 4 °C)   TempSrc: Temporal   Weight: 117 kg (257 lb 6 oz)   Height: 6' 1" (1 854 m)        Physical Exam  Constitutional:       General: He is not in acute distress  Appearance: He is well-developed  HENT:      Head: Normocephalic and atraumatic  Right Ear: Tympanic membrane normal       Left Ear: Tympanic membrane normal    Eyes:      Conjunctiva/sclera: Conjunctivae normal    Neck:      Musculoskeletal: Normal range of motion  Cardiovascular:      Rate and Rhythm: Normal rate and regular rhythm  Pulmonary:      Effort: Pulmonary effort is normal    Abdominal:      General: Abdomen is flat  Bowel sounds are normal  There is no distension  Palpations: Abdomen is soft  There is no mass  Musculoskeletal: Normal range of motion  Skin:     General: Skin is warm  Findings: No rash     Neurological:      Mental Status: He is alert and oriented to person, place, and time  Psychiatric:         Mood and Affect: Mood normal            BMI Counseling: Body mass index is 33 96 kg/m²  The BMI is above normal  Nutrition recommendations include decreasing portion sizes  Exercise recommendations include exercising 3-5 times per week

## 2020-09-10 ENCOUNTER — CLINICAL SUPPORT (OUTPATIENT)
Dept: FAMILY MEDICINE CLINIC | Facility: CLINIC | Age: 25
End: 2020-09-10

## 2020-09-10 DIAGNOSIS — Z11.1 ENCOUNTER FOR READING OF TUBERCULIN SKIN TEST: Primary | ICD-10-CM

## 2020-09-10 LAB
INDURATION: 0 MM
TB SKIN TEST: NEGATIVE

## 2020-09-10 NOTE — PROGRESS NOTES
Presents for PPD reading  Results read as negative (0mm) at 1444 on (L) forearm  Documentation provided and pt left the office in no distress  --bb

## 2020-09-11 ENCOUNTER — OFFICE VISIT (OUTPATIENT)
Dept: FAMILY MEDICINE CLINIC | Facility: CLINIC | Age: 25
End: 2020-09-11
Payer: COMMERCIAL

## 2020-09-11 VITALS
WEIGHT: 260 LBS | HEART RATE: 104 BPM | TEMPERATURE: 97.3 F | BODY MASS INDEX: 34.46 KG/M2 | DIASTOLIC BLOOD PRESSURE: 80 MMHG | SYSTOLIC BLOOD PRESSURE: 132 MMHG | HEIGHT: 73 IN

## 2020-09-11 DIAGNOSIS — Z20.2 EXPOSURE TO GONORRHEA: Primary | ICD-10-CM

## 2020-09-11 DIAGNOSIS — R76.8 HSV-2 SEROPOSITIVE: ICD-10-CM

## 2020-09-11 DIAGNOSIS — F41.0 PANIC DISORDER WITHOUT AGORAPHOBIA WITH SEVERE PANIC ATTACKS: ICD-10-CM

## 2020-09-11 PROCEDURE — 99214 OFFICE O/P EST MOD 30 MIN: CPT | Performed by: PHYSICIAN ASSISTANT

## 2020-09-11 RX ORDER — DOXYCYCLINE HYCLATE 100 MG/1
100 CAPSULE ORAL EVERY 12 HOURS SCHEDULED
Qty: 14 CAPSULE | Refills: 0 | Status: SHIPPED | OUTPATIENT
Start: 2020-09-11 | End: 2020-09-18

## 2020-09-11 NOTE — PATIENT INSTRUCTIONS
Assessment/plan:  1  Exposure to gonorrhea-patient's girlfriend tested positive yesterday and is being treated  We will treat him with a 7 day course of doxycycline 100 mg twice daily since he has allergies to azithromycin, cephalosporins, and penicillins  After he completes the antibiotic I would recommend full STD panel including gonorrhea for test of cure  Follow-up with these results as necessary  Patient to remain abstinent in the meantime  2  HSV 2 infection-patient continues Valtrex for suppressive therapy  3  Panic disorder-presently stable on Wellbutrin, no medication changes

## 2020-09-11 NOTE — PROGRESS NOTES
Assessment and Plan:  Patient Instructions   Assessment/plan:  1  Exposure to gonorrhea-patient's girlfriend tested positive yesterday and is being treated  We will treat him with a 7 day course of doxycycline 100 mg twice daily since he has allergies to azithromycin, cephalosporins, and penicillins  After he completes the antibiotic I would recommend full STD panel including gonorrhea for test of cure  Follow-up with these results as necessary  Patient to remain abstinent in the meantime  2  HSV 2 infection-patient continues Valtrex for suppressive therapy  3  Panic disorder-presently stable on Wellbutrin, no medication changes  Problem List Items Addressed This Visit        Other    Panic disorder without agoraphobia with severe panic attacks    HSV-2 seropositive      Other Visit Diagnoses     Exposure to gonorrhea    -  Primary    Relevant Medications    doxycycline hyclate (VIBRAMYCIN) 100 mg capsule    Other Relevant Orders    Chlamydia/GC amplified DNA by PCR    Hepatitis panel, acute    HIV 1/2 Antigen/Antibody (4th Generation) w Reflex [facility]    HSV TYPE 1,2 DNA PCR    RPR                 Diagnoses and all orders for this visit:    Exposure to gonorrhea  -     Chlamydia/GC amplified DNA by PCR  -     Hepatitis panel, acute  -     HIV 1/2 Antigen/Antibody (4th Generation) w Reflex [facility]  -     HSV TYPE 1,2 DNA PCR  -     RPR  -     doxycycline hyclate (VIBRAMYCIN) 100 mg capsule; Take 1 capsule (100 mg total) by mouth every 12 (twelve) hours for 7 days    HSV-2 seropositive    Panic disorder without agoraphobia with severe panic attacks              Subjective:      Patient ID: Teofilo Larson is a 26 y o  male  CC:    Chief Complaint   Patient presents with    Exposure to STD       HPI:    HPI:  This is a 80-year-old gentleman that presents to the office with concerns over recent exposure to gonorrhea  His girlfriend was tested yesterday and positive  He last had intercourse with her last week  He has had some symptoms of frequency of urination but no penile discharge or irritation  He does also have a history of HSV 2 infection and was recently started on Valtrex for suppressive therapy  The following portions of the patient's history were reviewed and updated as appropriate: allergies, current medications, past family history, past medical history, past social history, past surgical history and problem list       Review of Systems   Constitutional: Negative for chills, fatigue and fever  HENT: Negative for congestion, ear pain and sinus pressure  Eyes: Negative for visual disturbance  Respiratory: Negative for cough, chest tightness and shortness of breath  Cardiovascular: Negative for chest pain and palpitations  Gastrointestinal: Negative for diarrhea, nausea and vomiting  Endocrine: Negative for polyuria  Genitourinary: Negative for dysuria and frequency  Musculoskeletal: Negative for arthralgias and myalgias  Skin: Negative for pallor and rash  Neurological: Negative for dizziness, weakness, light-headedness, numbness and headaches  Psychiatric/Behavioral: Negative for agitation, behavioral problems and sleep disturbance  All other systems reviewed and are negative  Data to review:       Objective:    Vitals:    09/11/20 1124   BP: 132/80   BP Location: Left arm   Patient Position: Sitting   Cuff Size: Adult   Pulse: 104   Temp: (!) 97 3 °F (36 3 °C)   TempSrc: Temporal   Weight: 118 kg (260 lb)   Height: 6' 1" (1 854 m)        Physical Exam  Constitutional:       General: He is not in acute distress  Appearance: He is well-developed  HENT:      Head: Normocephalic and atraumatic  Right Ear: Tympanic membrane normal       Left Ear: Tympanic membrane normal    Eyes:      Conjunctiva/sclera: Conjunctivae normal    Neck:      Musculoskeletal: Normal range of motion  Cardiovascular:      Rate and Rhythm: Normal rate and regular rhythm     Pulmonary: Effort: Pulmonary effort is normal    Abdominal:      General: Abdomen is flat  Bowel sounds are normal  There is no distension  Palpations: Abdomen is soft  There is no mass  Musculoskeletal: Normal range of motion  Skin:     General: Skin is warm  Findings: No rash  Neurological:      Mental Status: He is alert and oriented to person, place, and time     Psychiatric:         Mood and Affect: Mood normal

## 2020-09-22 LAB
C TRACH RRNA SPEC QL NAA+PROBE: NOT DETECTED
HAV IGM SERPL QL IA: NORMAL
HBV CORE IGM SERPL QL IA: NORMAL
HBV SURFACE AG SERPL QL IA: NORMAL
HCV AB S/CO SERPL IA: 0.02
HCV AB SERPL QL IA: NORMAL
HIV 1+2 AB+HIV1 P24 AG SERPL QL IA: NORMAL
HSV1 DNA SPEC QL NAA+PROBE: NOT DETECTED
HSV2 DNA SPEC QL NAA+PROBE: NOT DETECTED
N GONORRHOEA RRNA SPEC QL NAA+PROBE: NOT DETECTED
RPR SER QL: NORMAL
SPECIMEN SOURCE: NORMAL

## 2020-10-20 ENCOUNTER — OFFICE VISIT (OUTPATIENT)
Dept: FAMILY MEDICINE CLINIC | Facility: CLINIC | Age: 25
End: 2020-10-20
Payer: COMMERCIAL

## 2020-10-20 ENCOUNTER — LAB (OUTPATIENT)
Dept: LAB | Facility: CLINIC | Age: 25
End: 2020-10-20
Payer: COMMERCIAL

## 2020-10-20 VITALS
DIASTOLIC BLOOD PRESSURE: 80 MMHG | BODY MASS INDEX: 34.46 KG/M2 | HEIGHT: 73 IN | SYSTOLIC BLOOD PRESSURE: 118 MMHG | TEMPERATURE: 97.6 F | WEIGHT: 260 LBS | HEART RATE: 74 BPM

## 2020-10-20 DIAGNOSIS — Z00.00 HEALTH CARE MAINTENANCE: ICD-10-CM

## 2020-10-20 DIAGNOSIS — Z20.2 EXPOSURE TO SEXUALLY TRANSMITTED DISEASE (STD): ICD-10-CM

## 2020-10-20 DIAGNOSIS — F41.1 GENERALIZED ANXIETY DISORDER: ICD-10-CM

## 2020-10-20 DIAGNOSIS — E66.9 CLASS 1 OBESITY WITHOUT SERIOUS COMORBIDITY WITH BODY MASS INDEX (BMI) OF 31.0 TO 31.9 IN ADULT, UNSPECIFIED OBESITY TYPE: ICD-10-CM

## 2020-10-20 DIAGNOSIS — R76.8 HSV-2 SEROPOSITIVE: Primary | ICD-10-CM

## 2020-10-20 DIAGNOSIS — R76.8 HSV-2 SEROPOSITIVE: ICD-10-CM

## 2020-10-20 DIAGNOSIS — F32.9 MAJOR DEPRESSIVE DISORDER, SINGLE EPISODE WITH ANXIOUS DISTRESS: ICD-10-CM

## 2020-10-20 PROBLEM — J01.90 ACUTE NON-RECURRENT SINUSITIS: Status: RESOLVED | Noted: 2020-06-12 | Resolved: 2020-10-20

## 2020-10-20 LAB
HAV IGM SER QL: NORMAL
HBV CORE IGM SER QL: NORMAL
HBV SURFACE AG SER QL: NORMAL
HCV AB SER QL: NORMAL

## 2020-10-20 PROCEDURE — 87491 CHLMYD TRACH DNA AMP PROBE: CPT

## 2020-10-20 PROCEDURE — 87389 HIV-1 AG W/HIV-1&-2 AB AG IA: CPT

## 2020-10-20 PROCEDURE — 86696 HERPES SIMPLEX TYPE 2 TEST: CPT

## 2020-10-20 PROCEDURE — 87591 N.GONORRHOEAE DNA AMP PROB: CPT

## 2020-10-20 PROCEDURE — 1036F TOBACCO NON-USER: CPT | Performed by: FAMILY MEDICINE

## 2020-10-20 PROCEDURE — 86592 SYPHILIS TEST NON-TREP QUAL: CPT

## 2020-10-20 PROCEDURE — 86695 HERPES SIMPLEX TYPE 1 TEST: CPT

## 2020-10-20 PROCEDURE — 3725F SCREEN DEPRESSION PERFORMED: CPT | Performed by: FAMILY MEDICINE

## 2020-10-20 PROCEDURE — 99214 OFFICE O/P EST MOD 30 MIN: CPT | Performed by: FAMILY MEDICINE

## 2020-10-20 PROCEDURE — 80074 ACUTE HEPATITIS PANEL: CPT

## 2020-10-20 PROCEDURE — 36415 COLL VENOUS BLD VENIPUNCTURE: CPT

## 2020-10-21 LAB
HIV 1+2 AB+HIV1 P24 AG SERPL QL IA: NORMAL
HSV1 IGG SER IA-ACNC: 1.05 INDEX (ref 0–0.9)
HSV2 IGG SER IA-ACNC: >23.6 INDEX (ref 0–0.9)
RPR SER QL: NORMAL

## 2020-10-22 LAB
C TRACH DNA SPEC QL NAA+PROBE: NEGATIVE
HSV1 IGM TITR SER IF: NORMAL TITER
HSV2 IGM TITR SER IF: NORMAL TITER
N GONORRHOEA DNA SPEC QL NAA+PROBE: NEGATIVE

## 2020-12-06 DIAGNOSIS — F32.9 MAJOR DEPRESSIVE DISORDER, SINGLE EPISODE WITH ANXIOUS DISTRESS: ICD-10-CM

## 2020-12-06 DIAGNOSIS — F90.9 ADULT ADHD: ICD-10-CM

## 2020-12-06 DIAGNOSIS — F41.0 PANIC DISORDER WITHOUT AGORAPHOBIA WITH SEVERE PANIC ATTACKS: ICD-10-CM

## 2020-12-06 DIAGNOSIS — F41.1 GENERALIZED ANXIETY DISORDER: ICD-10-CM

## 2020-12-07 RX ORDER — BUPROPION HYDROCHLORIDE 150 MG/1
TABLET ORAL
Qty: 30 TABLET | Refills: 5 | Status: SHIPPED | OUTPATIENT
Start: 2020-12-07 | End: 2021-04-27 | Stop reason: SDUPTHER

## 2021-01-11 ENCOUNTER — TELEPHONE (OUTPATIENT)
Dept: FAMILY MEDICINE CLINIC | Facility: CLINIC | Age: 26
End: 2021-01-11

## 2021-01-11 DIAGNOSIS — R76.8 HSV-2 SEROPOSITIVE: ICD-10-CM

## 2021-01-11 RX ORDER — VALACYCLOVIR HYDROCHLORIDE 1 G/1
TABLET, FILM COATED ORAL
Qty: 30 TABLET | Refills: 0 | Status: SHIPPED | OUTPATIENT
Start: 2021-01-11 | End: 2021-04-27

## 2021-01-11 NOTE — TELEPHONE ENCOUNTER
Pt called will like a refill of Valacyclovir sent to [facility] on [address] [facility] in [place]

## 2021-01-14 ENCOUNTER — TELEMEDICINE (OUTPATIENT)
Dept: FAMILY MEDICINE CLINIC | Facility: CLINIC | Age: 26
End: 2021-01-14
Payer: OTHER GOVERNMENT

## 2021-01-14 DIAGNOSIS — Z20.822 EXPOSURE TO COVID-19 VIRUS: Primary | ICD-10-CM

## 2021-01-14 DIAGNOSIS — Z20.822 EXPOSURE TO COVID-19 VIRUS: ICD-10-CM

## 2021-01-14 PROCEDURE — U0003 INFECTIOUS AGENT DETECTION BY NUCLEIC ACID (DNA OR RNA); SEVERE ACUTE RESPIRATORY SYNDROME CORONAVIRUS 2 (SARS-COV-2) (CORONAVIRUS DISEASE [COVID-19]), AMPLIFIED PROBE TECHNIQUE, MAKING USE OF HIGH THROUGHPUT TECHNOLOGIES AS DESCRIBED BY CMS-2020-01-R: HCPCS | Performed by: NURSE PRACTITIONER

## 2021-01-14 PROCEDURE — U0005 INFEC AGEN DETEC AMPLI PROBE: HCPCS | Performed by: NURSE PRACTITIONER

## 2021-01-14 PROCEDURE — 99214 OFFICE O/P EST MOD 30 MIN: CPT | Performed by: NURSE PRACTITIONER

## 2021-01-14 NOTE — ASSESSMENT & PLAN NOTE
14Jan:  Patient is currently exhibiting very mild symptoms of abdominal pain, diarrhea, and headaches  COVID testing ordered    Will follow up with patient pending results of COVID test

## 2021-01-14 NOTE — PATIENT INSTRUCTIONS
Problem List Items Addressed This Visit        Other    Exposure to COVID-19 virus - Primary     14Jan:  Patient is currently exhibiting very mild symptoms of abdominal pain, diarrhea, and headaches  COVID testing ordered  Will follow up with patient pending results of COVID test          Relevant Orders    Novel Coronavirus (COVID-19), PCR [facility] Collected at   [facility] or [facility]          [facility] SPECIALTY [facility] Centralized COVID 19 Testing Sites:     TENT COVID TESTING SITES FOR SURGICAL/PROCEDURAL PATIENTS   [facility] Location  Address  Hours   Monday-Friday   Effective 01/11/21 Saturday Hours    [facility]  [address]  8am-5pm  [address]  [place], [facility]  8am-5pm  CLOSED    [address] (old ED)  [address]  [address]   [address] [facility]  [address]   [address]   ([employer] [address])  [address]  8am-5pm  [employer]  [address]  [address] [facility] [place] [address]  8am-1pm  [address]   [address]  8am-5pm  CLOSED      COVID-19 Home Care Guidelines    Your healthcare provider and/or public health staff have evaluated you and have determined that you do not need to remain in the hospital at this time  At this time you can be isolated at home where you will be monitored by staff from your local or state health department  You should carefully follow the prevention and isolation steps below until a healthcare provider or local or state health department says that you can return to your normal activities  Stay home except to get medical care    People who are mildly ill with COVID-19 are able to isolate at home during their illness   You should restrict activities outside your home, except for getting medical care  Do not go to work, school, or public areas  Avoid using public transportation, ride-sharing, or taxis  Separate yourself from other people and animals in your home    People: As much as possible, you should stay in a specific room and away from other people in your home  Also, you should use a separate bathroom, if available  Animals: You should restrict contact with pets and other animals while you are sick with COVID-19, just like you would around other people  Although there have not been reports of pets or other animals becoming sick with COVID-19, it is still recommended that people sick with COVID-19 limit contact with animals until more information is known about the virus  When possible, have another member of your household care for your animals while you are sick  If you are sick with COVID-19, avoid contact with your pet, including petting, snuggling, being kissed or licked, and sharing food  If you must care for your pet or be around animals while you are sick, wash your hands before and after you interact with pets and wear a facemask  See COVID-19 and Animals for more information  Call ahead before visiting your doctor    If you have a medical appointment, call the healthcare provider and tell them that you have or may have COVID-19  This will help the healthcare providers office take steps to keep other people from getting infected or exposed  Wear a facemask    You should wear a facemask when you are around other people (e g , sharing a room or vehicle) or pets and before you enter a healthcare providers office  If you are not able to wear a facemask (for example, because it causes trouble breathing), then people who live with you should not stay in the same room with you, or they should wear a facemask if they enter your room      Cover your coughs and sneezes    Cover your mouth and nose with a tissue when you cough or sneeze  Throw used tissues in a lined trash can  Immediately wash your hands with soap and water for at least 20 seconds or, if soap and water are not available, clean your hands with an alcohol-based hand  that contains at least 60% alcohol  Clean your hands often    Wash your hands often with soap and water for at least 20 seconds, especially after blowing your nose, coughing, or sneezing; going to the bathroom; and before eating or preparing food  If soap and water are not readily available, use an alcohol-based hand  with at least 60% alcohol, covering all surfaces of your hands and rubbing them together until they feel dry  Soap and water are the best option if hands are visibly dirty  Avoid touching your eyes, nose, and mouth with unwashed hands  Avoid sharing personal household items    You should not share dishes, drinking glasses, cups, eating utensils, towels, or bedding with other people or pets in your home  After using these items, they should be washed thoroughly with soap and water  Clean all high-touch surfaces everyday    High touch surfaces include counters, tabletops, doorknobs, bathroom fixtures, toilets, phones, keyboards, tablets, and bedside tables  Also, clean any surfaces that may have blood, stool, or body fluids on them  Use a household cleaning spray or wipe, according to the label instructions  Labels contain instructions for safe and effective use of the cleaning product including precautions you should take when applying the product, such as wearing gloves and making sure you have good ventilation during use of the product  Monitor your symptoms    Seek prompt medical attention if your illness is worsening (e g , difficulty breathing)  Before seeking care, call your healthcare provider and tell them that you have, or are being evaluated for, COVID-19  Put on a facemask before you enter the facility   These steps will help the healthcare providers office to keep other people in the office or waiting room from getting infected or exposed  Ask your healthcare provider to call the local or state health department  Persons who are placed under active monitoring or facilitated self-monitoring should follow instructions provided by their local health department or occupational health professionals, as appropriate  If you have a medical emergency and need to call 911, notify the dispatch personnel that you have, or are being evaluated for COVID-19  If possible, put on a facemask before emergency medical services arrive  Discontinuing home isolation    Patients with confirmed COVID-19 should remain under home isolation precautions until the following conditions are met:   - They have had no fever for at least 24 hours (that is one full day of no fever without the use medicine that reduces fevers)  AND  - other symptoms have improved (for example, when their cough or shortness of breath have improved)  AND  - If had mild or moderate illness, at least 10 days have passed since their symptoms first appeared or if severe illness (needed oxygen) or immunosuppressed, at least 20 days have passed since symptoms first appeared  Patients with confirmed COVID-19 should also notify close contacts (including their workplace) and ask that they self-quarantine  Currently, close contact is defined as being within 6 feet for 15 minutes or more from the period 24 hours starting 48 hours before symptom onset to the time at which the patient went into isolation  Close contacts of patients diagnosed with COVID-19 should be instructed by the patient to self-quarantine for 14 days from the last time of their last contact with the patient       Source: [employer]

## 2021-01-14 NOTE — PROGRESS NOTES
COVID-19 Virtual Visit     Assessment/Plan:    Problem List Items Addressed This Visit        Other    Exposure to COVID-19 virus - Primary     14Jan:  Patient is currently exhibiting very mild symptoms of abdominal pain, diarrhea, and headaches  COVID testing ordered  Will follow up with patient pending results of COVID test          Relevant Orders    Novel Coronavirus (COVID-19), PCR [facility] Collected at [facility] or [facility]         Disposition:     I referred patient to one of our centralized sites for a COVID-19 swab  I have spent 15 minutes directly with the patient  Encounter provider KEMAR Rosas    Provider located at [address] [place] [id number]    Recent Visits  Date Type Provider Dept   01/11/21 Telephone [facility] Primary Care   Showing recent visits within past 7 days and meeting all other requirements     Today's Visits  Date Type Provider Dept   01/14/21 Telemedicine [facility] Primary Care   Showing today's visits and meeting all other requirements     Future Appointments  No visits were found meeting these conditions  Showing future appointments within next 150 days and meeting all other requirements      This virtual check-in was done via [employer] Du[employer] and patient was informed that this is not a secure, HIPAA-compliant platform  He agrees to proceed  Patient agrees to participate in a virtual check in via telephone or video visit instead of presenting to the office to address urgent/immediate medical needs  Patient is aware this is a billable service  After connecting through [facility], the patient was identified by name and date of birth  Ronney Cranker was informed that this was a telemedicine visit and that the exam was being conducted confidentially over secure lines  My office door was closed  No one else was in the room   Ronney Cranker acknowledged consent and understanding of privacy and security of the telemedicine visit  I informed the patient that I have reviewed his record in Epic and presented the opportunity for him to ask any questions regarding the visit today  The patient agreed to participate  Subjective: Vik Hernandez is a 26 y o  male who is concerned about COVID-19  Patient's symptoms include abdominal pain, diarrhea and headache  Patient denies fever, chills, fatigue, malaise, congestion, rhinorrhea, sore throat, anosmia, loss of taste, cough, shortness of breath, chest tightness, nausea, vomiting and myalgias  Date of symptom onset: 1/13/2021  Date of exposure: 1/9/2021    Exposure:   Contact with a person who is under investigation (PUI) for or who is positive for COVID-19 within the last 14 days?: Yes    Hospitalized recently for fever and/or lower respiratory symptoms?: No      Currently a healthcare worker that is involved in direct patient care?: No      Works in a special setting where the risk of COVID-19 transmission may be high? (this may include long-term care, correctional and MCC facilities; homeless shelters; assisted-living facilities and group homes ): No      Resident in a special setting where the risk of COVID-19 transmission may be high? (this may include long-term care, correctional and MCC facilities; homeless shelters; assisted-living facilities and group homes ): No      Patient was exposed to his father who recently tested positive for [place]  He was last exposed to his father on 01/09/2021  Patient is currently experiencing symptoms of diarrhea, abdominal pain, and headaches  He denies any fevers or shortness of breath  Since the patient is having mild symptoms and it has been 5 days since his exposure I will have the patient tested for COVID  Patient was also advised to quarantine until he receives his results      No results found for: [address], [address] [facility], [address] [facility], [place] [address]  Past Medical History:   Diagnosis Date    ADHD (attention deficit hyperactivity disorder)     Anxiety      Past Surgical History:   Procedure Laterality Date    NO PAST SURGERIES       Current Outpatient Medications   Medication Sig Dispense Refill    buPROPion (WELLBUTRIN XL) 150 mg 24 hr tablet take 1 tablet by mouth every morning 30 tablet 5    valACYclovir (VALTREX) 1,000 mg tablet Take 1 tab daily 30 tablet 0     No current facility-administered medications for this visit  Allergies   Allergen Reactions    Paxil [Paroxetine] Other (See Comments)     When doses greater than 20 mg patient had suicidal ideation    Azithromycin Hives    Cefuroxime Hives and Rash    Penicillins Hives and Rash       Review of Systems   Constitutional: Negative for chills, fatigue and fever  HENT: Negative for congestion, rhinorrhea and sore throat  Eyes: Negative  Respiratory: Negative for cough, chest tightness and shortness of breath  Cardiovascular: Negative for chest pain and palpitations  Gastrointestinal: Positive for abdominal pain and diarrhea  Negative for nausea and vomiting  Endocrine: Negative  Genitourinary: Negative  Musculoskeletal: Negative for myalgias  Skin: Negative  Allergic/Immunologic: Negative  Neurological: Positive for headaches  Hematological: Negative  Psychiatric/Behavioral: Negative  Objective: There were no vitals filed for this visit  Physical Exam  Vitals reviewed: limited due to Google Duo visit    Constitutional:       General: He is not in acute distress  Appearance: Normal appearance  He is not ill-appearing  Neurological:      Mental Status: He is alert  VIRTUAL VISIT DISCLAIMER    Tres Knight acknowledges that he has consented to an online visit or consultation   He understands that the online visit is based solely on information provided by him, and that, in the absence of a face-to-face physical evaluation by the physician, the diagnosis he receives is both limited and provisional in terms of accuracy and completeness  This is not intended to replace a full medical face-to-face evaluation by the physician  Nash Mendoza understands and accepts these terms

## 2021-01-15 LAB — SARS-COV-2 RNA RESP QL NAA+PROBE: NEGATIVE

## 2021-04-20 PROBLEM — Z98.890 S/P LEFT INGUINAL HERNIA REPAIR: Status: RESOLVED | Noted: 2018-01-16 | Resolved: 2021-04-20

## 2021-04-20 PROBLEM — Z20.822 EXPOSURE TO COVID-19 VIRUS: Status: RESOLVED | Noted: 2021-01-14 | Resolved: 2021-04-20

## 2021-04-20 PROBLEM — G44.209 TENSION HEADACHE: Status: RESOLVED | Noted: 2018-12-10 | Resolved: 2021-04-20

## 2021-04-20 PROBLEM — Z87.19 S/P LEFT INGUINAL HERNIA REPAIR: Status: RESOLVED | Noted: 2018-01-16 | Resolved: 2021-04-20

## 2021-04-20 PROBLEM — R41.3 MEMORY DEFICIT: Status: RESOLVED | Noted: 2018-12-03 | Resolved: 2021-04-20

## 2021-04-20 PROBLEM — M79.674 PAIN OF TOE OF RIGHT FOOT: Status: RESOLVED | Noted: 2018-09-26 | Resolved: 2021-04-20

## 2021-04-27 ENCOUNTER — OFFICE VISIT (OUTPATIENT)
Dept: FAMILY MEDICINE CLINIC | Facility: CLINIC | Age: 26
End: 2021-04-27
Payer: COMMERCIAL

## 2021-04-27 VITALS
BODY MASS INDEX: 34.85 KG/M2 | WEIGHT: 263 LBS | HEIGHT: 73 IN | SYSTOLIC BLOOD PRESSURE: 130 MMHG | HEART RATE: 94 BPM | DIASTOLIC BLOOD PRESSURE: 80 MMHG

## 2021-04-27 DIAGNOSIS — R56.9 PSEUDOSEIZURES (HCC): ICD-10-CM

## 2021-04-27 DIAGNOSIS — F32.9 MAJOR DEPRESSIVE DISORDER, SINGLE EPISODE WITH ANXIOUS DISTRESS: ICD-10-CM

## 2021-04-27 DIAGNOSIS — F90.9 ADULT ADHD: ICD-10-CM

## 2021-04-27 DIAGNOSIS — F41.0 PANIC DISORDER WITHOUT AGORAPHOBIA WITH SEVERE PANIC ATTACKS: ICD-10-CM

## 2021-04-27 DIAGNOSIS — R53.83 FATIGUE, UNSPECIFIED TYPE: ICD-10-CM

## 2021-04-27 DIAGNOSIS — E78.5 DYSLIPIDEMIA: ICD-10-CM

## 2021-04-27 DIAGNOSIS — E66.9 OBESITY (BMI 30-39.9): ICD-10-CM

## 2021-04-27 DIAGNOSIS — F41.1 GENERALIZED ANXIETY DISORDER: Primary | ICD-10-CM

## 2021-04-27 PROCEDURE — 1036F TOBACCO NON-USER: CPT | Performed by: FAMILY MEDICINE

## 2021-04-27 PROCEDURE — 99214 OFFICE O/P EST MOD 30 MIN: CPT | Performed by: FAMILY MEDICINE

## 2021-04-27 RX ORDER — BUPROPION HYDROCHLORIDE 300 MG/1
300 TABLET ORAL EVERY MORNING
Qty: 30 TABLET | Refills: 5 | Status: SHIPPED | OUTPATIENT
Start: 2021-04-27

## 2021-04-27 RX ORDER — BUPROPION HYDROCHLORIDE 300 MG/1
300 TABLET ORAL EVERY MORNING
Qty: 30 TABLET | Refills: 5 | Status: SHIPPED | OUTPATIENT
Start: 2021-04-27 | End: 2021-04-27 | Stop reason: SDUPTHER

## 2021-04-27 NOTE — PROGRESS NOTES
Assessment and Plan:    1  GA D  2  MDD  3  Panic disorder  Increase bupropion to  mg daily risk and benefit discussed specially increased risk of suicidal ideation  4  Fatigue, blood work ordered  5  Pseudoseizures, no recurs only 6  Adult ADHD, stable on no medications  7  Dyslipidemia, blood work ordered  8  BMI 34 7 patient gained 3 lb diet exercise weight loss recommended  9  Recheck 2 weeks  If worsen call office    Will leave off work until 05/02/2021  Problem List Items Addressed This Visit        Nervous and Auditory    Pseudoseizures      Was evaluated by Neurology, no recurrence         Relevant Medications    buPROPion (WELLBUTRIN XL) 300 mg 24 hr tablet    Other Relevant Orders    CBC    Comprehensive metabolic panel    Lipid Panel with Direct LDL reflex    TSH, 3rd generation with Free T4 reflex    UA (URINE) with reflex to Scope       Other    Major depressive disorder, single episode with anxious distress      Increase Wellbutrin to  mg daily         Relevant Medications    buPROPion (WELLBUTRIN XL) 300 mg 24 hr tablet    Other Relevant Orders    CBC    Comprehensive metabolic panel    Lipid Panel with Direct LDL reflex    TSH, 3rd generation with Free T4 reflex    UA (URINE) with reflex to Scope    Panic disorder without agoraphobia with severe panic attacks      Increasing bupropion as above         Relevant Medications    buPROPion (WELLBUTRIN XL) 300 mg 24 hr tablet    Other Relevant Orders    CBC    Comprehensive metabolic panel    Lipid Panel with Direct LDL reflex    TSH, 3rd generation with Free T4 reflex    UA (URINE) with reflex to Scope    Adult ADHD    Relevant Medications    buPROPion (WELLBUTRIN XL) 300 mg 24 hr tablet    Generalized anxiety disorder - Primary       Increase to Wellbutrin  mg daily         Relevant Medications    buPROPion (WELLBUTRIN XL) 300 mg 24 hr tablet    Other Relevant Orders    CBC    Comprehensive metabolic panel    Lipid Panel with Direct LDL reflex    TSH, 3rd generation with Free T4 reflex    UA (URINE) with reflex to Scope    Fatigue      Blood work ordered         Relevant Orders    CBC    Comprehensive metabolic panel    Lipid Panel with Direct LDL reflex    TSH, 3rd generation with Free T4 reflex    UA (URINE) with reflex to Scope    Obesity (BMI 30-39  9)      BMI 34 7 patient gained 3 lb diet exercise weight loss recommended         Relevant Orders    CBC    Comprehensive metabolic panel    Lipid Panel with Direct LDL reflex    TSH, 3rd generation with Free T4 reflex    UA (URINE) with reflex to Scope      Other Visit Diagnoses     Dyslipidemia        Relevant Orders    CBC    Comprehensive metabolic panel    Lipid Panel with Direct LDL reflex    TSH, 3rd generation with Free T4 reflex    UA (URINE) with reflex to Scope                 Diagnoses and all orders for this visit:    Generalized anxiety disorder  -     CBC; Future  -     Comprehensive metabolic panel; Future  -     Lipid Panel with Direct LDL reflex; Future  -     TSH, 3rd generation with Free T4 reflex; Future  -     UA (URINE) with reflex to Scope; Future  -     buPROPion (WELLBUTRIN XL) 300 mg 24 hr tablet; Take 1 tablet (300 mg total) by mouth every morning    Fatigue, unspecified type  -     CBC; Future  -     Comprehensive metabolic panel; Future  -     Lipid Panel with Direct LDL reflex; Future  -     TSH, 3rd generation with Free T4 reflex; Future  -     UA (URINE) with reflex to Scope; Future    Major depressive disorder, single episode with anxious distress  -     CBC; Future  -     Comprehensive metabolic panel; Future  -     Lipid Panel with Direct LDL reflex; Future  -     TSH, 3rd generation with Free T4 reflex; Future  -     UA (URINE) with reflex to Scope; Future  -     buPROPion (WELLBUTRIN XL) 300 mg 24 hr tablet; Take 1 tablet (300 mg total) by mouth every morning    Obesity (BMI 30-39 9)  -     CBC; Future  -     Comprehensive metabolic panel;  Future  - Lipid Panel with Direct LDL reflex; Future  -     TSH, 3rd generation with Free T4 reflex; Future  -     UA (URINE) with reflex to Scope; Future    Panic disorder without agoraphobia with severe panic attacks  -     CBC; Future  -     Comprehensive metabolic panel; Future  -     Lipid Panel with Direct LDL reflex; Future  -     TSH, 3rd generation with Free T4 reflex; Future  -     UA (URINE) with reflex to Scope; Future  -     buPROPion (WELLBUTRIN XL) 300 mg 24 hr tablet; Take 1 tablet (300 mg total) by mouth every morning    Pseudoseizures  -     CBC; Future  -     Comprehensive metabolic panel; Future  -     Lipid Panel with Direct LDL reflex; Future  -     TSH, 3rd generation with Free T4 reflex; Future  -     UA (URINE) with reflex to Scope; Future    Dyslipidemia  -     CBC; Future  -     Comprehensive metabolic panel; Future  -     Lipid Panel with Direct LDL reflex; Future  -     TSH, 3rd generation with Free T4 reflex; Future  -     UA (URINE) with reflex to Scope; Future    Adult ADHD  -     buPROPion (WELLBUTRIN XL) 300 mg 24 hr tablet; Take 1 tablet (300 mg total) by mouth every morning              Subjective:      Patient ID: Rody Mendoza is a 23 y o  male  CC:    Chief Complaint   Patient presents with    Anxiety    Depression    Medication Management     mjs       HPI:     Patient has been out of his medication feels his depression anxiety seems worse  Increased stress at work  Feels very fatigued  Negative SI/ HI  The following portions of the patient's history were reviewed and updated as appropriate: allergies, current medications, past family history, past medical history, past social history, past surgical history and problem list       Review of Systems   Constitutional:        HPI, in addition patient gained 3 lb   HENT: Negative  Eyes: Negative  Respiratory: Negative  Cardiovascular: Negative  Gastrointestinal: Negative  Endocrine: Negative      Genitourinary: Negative  Musculoskeletal: Negative  Skin: Negative  Allergic/Immunologic: Negative  Neurological: Negative  Hematological: Negative  Psychiatric/Behavioral:        HPI         Data to review:       Objective:    Vitals:    04/27/21 1351 04/27/21 1401   BP: 130/80    Pulse: (!) 108 94   Weight: 119 kg (263 lb)    Height: 6' 1" (1 854 m)         Physical Exam  Vitals signs reviewed  Constitutional:       Appearance: Normal appearance  HENT:      Head: Normocephalic and atraumatic  Eyes:      General: No scleral icterus  Neck:      Musculoskeletal: Neck supple  Cardiovascular:      Rate and Rhythm: Normal rate and regular rhythm  Heart sounds: Normal heart sounds  Pulmonary:      Effort: Pulmonary effort is normal       Breath sounds: Normal breath sounds  Musculoskeletal:      Right lower leg: No edema  Left lower leg: No edema  Skin:     General: Skin is warm and dry  Neurological:      Mental Status: He is alert and oriented to person, place, and time  Cranial Nerves: No cranial nerve deficit  Psychiatric:         Behavior: Behavior normal          Thought Content: Thought content normal          Judgment: Judgment normal       Comments: Seems mildly anxious           BMI Counseling: Body mass index is 34 7 kg/m²  The BMI is above normal  Nutrition recommendations include moderation in carbohydrate intake and reducing intake of cholesterol  Exercise recommendations include exercising 3-5 times per week

## 2021-04-27 NOTE — LETTER
April 27, 2021     Patient: Gerardo Field   YOB: 1995   Date of Visit: 4/27/2021       To Whom It May Concern: It is my medical opinion that Marino Lewis may return to work on 05/02/2021  If you have any questions or concerns, please don't hesitate to call           Sincerely,        Jamar Schmid DO    CC: No Recipients

## 2021-05-13 ENCOUNTER — TELEPHONE (OUTPATIENT)
Dept: FAMILY MEDICINE CLINIC | Facility: CLINIC | Age: 26
End: 2021-05-13

## 2021-05-13 NOTE — TELEPHONE ENCOUNTER
Patient did not show up for scheduled appointment today    I recommend patient complete blood work that was ordered and reschedule appointment

## 2021-10-07 ENCOUNTER — TELEPHONE (OUTPATIENT)
Dept: FAMILY MEDICINE CLINIC | Facility: CLINIC | Age: 26
End: 2021-10-07

## 2021-10-07 ENCOUNTER — OFFICE VISIT (OUTPATIENT)
Dept: FAMILY MEDICINE CLINIC | Facility: CLINIC | Age: 26
End: 2021-10-07
Payer: COMMERCIAL

## 2021-10-07 VITALS
RESPIRATION RATE: 16 BRPM | HEART RATE: 72 BPM | HEIGHT: 73 IN | DIASTOLIC BLOOD PRESSURE: 76 MMHG | SYSTOLIC BLOOD PRESSURE: 122 MMHG | WEIGHT: 269 LBS | BODY MASS INDEX: 35.65 KG/M2

## 2021-10-07 DIAGNOSIS — E80.4 GILBERT'S SYNDROME: ICD-10-CM

## 2021-10-07 DIAGNOSIS — Z11.3 SCREENING FOR STDS (SEXUALLY TRANSMITTED DISEASES): Primary | ICD-10-CM

## 2021-10-07 DIAGNOSIS — F32.9 MAJOR DEPRESSIVE DISORDER, SINGLE EPISODE WITH ANXIOUS DISTRESS: ICD-10-CM

## 2021-10-07 DIAGNOSIS — F41.1 GENERALIZED ANXIETY DISORDER: ICD-10-CM

## 2021-10-07 DIAGNOSIS — J30.9 ALLERGIC RHINITIS, UNSPECIFIED SEASONALITY, UNSPECIFIED TRIGGER: ICD-10-CM

## 2021-10-07 DIAGNOSIS — Z13.220 SCREENING FOR LIPID DISORDERS: ICD-10-CM

## 2021-10-07 DIAGNOSIS — Z13.1 SCREENING FOR DIABETES MELLITUS: ICD-10-CM

## 2021-10-07 DIAGNOSIS — Z13.29 SCREENING FOR THYROID DISORDER: ICD-10-CM

## 2021-10-07 PROCEDURE — 99214 OFFICE O/P EST MOD 30 MIN: CPT | Performed by: NURSE PRACTITIONER

## 2021-10-07 PROCEDURE — 1036F TOBACCO NON-USER: CPT | Performed by: NURSE PRACTITIONER

## 2021-10-07 PROCEDURE — 3008F BODY MASS INDEX DOCD: CPT | Performed by: NURSE PRACTITIONER

## 2021-10-08 ENCOUNTER — TELEPHONE (OUTPATIENT)
Dept: FAMILY MEDICINE CLINIC | Facility: CLINIC | Age: 26
End: 2021-10-08

## 2021-10-08 ENCOUNTER — APPOINTMENT (OUTPATIENT)
Dept: LAB | Facility: CLINIC | Age: 26
End: 2021-10-08
Payer: COMMERCIAL

## 2021-10-08 DIAGNOSIS — Z13.1 SCREENING FOR DIABETES MELLITUS: ICD-10-CM

## 2021-10-08 DIAGNOSIS — Z13.29 SCREENING FOR THYROID DISORDER: ICD-10-CM

## 2021-10-08 DIAGNOSIS — Z11.3 SCREENING FOR STDS (SEXUALLY TRANSMITTED DISEASES): ICD-10-CM

## 2021-10-08 DIAGNOSIS — Z13.220 SCREENING FOR LIPID DISORDERS: ICD-10-CM

## 2021-10-08 LAB
BILIRUB UR QL STRIP: NEGATIVE
CHOLEST SERPL-MCNC: 186 MG/DL (ref 50–200)
CLARITY UR: CLEAR
COLOR UR: YELLOW
GLUCOSE UR STRIP-MCNC: NEGATIVE MG/DL
HDLC SERPL-MCNC: 42 MG/DL
HGB UR QL STRIP.AUTO: NEGATIVE
KETONES UR STRIP-MCNC: NEGATIVE MG/DL
LDLC SERPL CALC-MCNC: 97 MG/DL (ref 0–100)
LEUKOCYTE ESTERASE UR QL STRIP: NEGATIVE
NITRITE UR QL STRIP: NEGATIVE
PH UR STRIP.AUTO: 6 [PH]
PROT UR STRIP-MCNC: NEGATIVE MG/DL
RPR SER QL: NORMAL
SP GR UR STRIP.AUTO: 1.03 (ref 1–1.03)
TRIGL SERPL-MCNC: 237 MG/DL
TSH SERPL DL<=0.05 MIU/L-ACNC: 5.54 UIU/ML (ref 0.36–3.74)
UROBILINOGEN UR QL STRIP.AUTO: 0.2 E.U./DL

## 2021-10-08 PROCEDURE — 87591 N.GONORRHOEAE DNA AMP PROB: CPT

## 2021-10-08 PROCEDURE — 84443 ASSAY THYROID STIM HORMONE: CPT

## 2021-10-08 PROCEDURE — 87389 HIV-1 AG W/HIV-1&-2 AB AG IA: CPT

## 2021-10-08 PROCEDURE — 87491 CHLMYD TRACH DNA AMP PROBE: CPT

## 2021-10-08 PROCEDURE — 87529 HSV DNA AMP PROBE: CPT

## 2021-10-08 PROCEDURE — 36415 COLL VENOUS BLD VENIPUNCTURE: CPT

## 2021-10-08 PROCEDURE — 81003 URINALYSIS AUTO W/O SCOPE: CPT

## 2021-10-08 PROCEDURE — 80061 LIPID PANEL: CPT

## 2021-10-08 PROCEDURE — 86592 SYPHILIS TEST NON-TREP QUAL: CPT

## 2021-10-09 LAB
C TRACH DNA SPEC QL NAA+PROBE: NEGATIVE
N GONORRHOEA DNA SPEC QL NAA+PROBE: NEGATIVE

## 2021-10-10 LAB — HIV 1+2 AB+HIV1 P24 AG SERPL QL IA: NORMAL

## 2021-10-13 DIAGNOSIS — E03.9 HYPOTHYROIDISM, UNSPECIFIED TYPE: Primary | ICD-10-CM

## 2021-10-13 LAB
HSV1 DNA SPEC QL NAA+PROBE: NEGATIVE
HSV2 DNA SPEC QL NAA+PROBE: NEGATIVE

## 2021-10-13 RX ORDER — LEVOTHYROXINE SODIUM 0.03 MG/1
25 TABLET ORAL
Qty: 30 TABLET | Refills: 2 | Status: SHIPPED | OUTPATIENT
Start: 2021-10-13 | End: 2021-11-04

## 2021-11-04 DIAGNOSIS — E03.9 HYPOTHYROIDISM, UNSPECIFIED TYPE: ICD-10-CM

## 2021-11-04 RX ORDER — LEVOTHYROXINE SODIUM 0.03 MG/1
25 TABLET ORAL
Qty: 30 TABLET | Refills: 2 | Status: SHIPPED | OUTPATIENT
Start: 2021-11-04 | End: 2022-07-11 | Stop reason: SDUPTHER

## 2021-11-21 ENCOUNTER — HOSPITAL ENCOUNTER (EMERGENCY)
Facility: HOSPITAL | Age: 26
Discharge: HOME | End: 2021-11-21
Attending: EMERGENCY MEDICINE | Admitting: EMERGENCY MEDICINE
Payer: COMMERCIAL

## 2021-11-21 VITALS
SYSTOLIC BLOOD PRESSURE: 139 MMHG | TEMPERATURE: 98.5 F | DIASTOLIC BLOOD PRESSURE: 84 MMHG | HEART RATE: 82 BPM | RESPIRATION RATE: 20 BRPM | OXYGEN SATURATION: 98 %

## 2021-11-21 DIAGNOSIS — F41.0 PANIC ATTACK: Primary | ICD-10-CM

## 2021-11-21 PROCEDURE — 99284 EMERGENCY DEPT VISIT MOD MDM: CPT | Performed by: EMERGENCY MEDICINE

## 2021-11-21 PROCEDURE — 99283 EMERGENCY DEPT VISIT LOW MDM: CPT

## 2021-12-29 DIAGNOSIS — B34.9 VIRAL INFECTION, UNSPECIFIED: Primary | ICD-10-CM

## 2021-12-30 PROCEDURE — U0003 INFECTIOUS AGENT DETECTION BY NUCLEIC ACID (DNA OR RNA); SEVERE ACUTE RESPIRATORY SYNDROME CORONAVIRUS 2 (SARS-COV-2) (CORONAVIRUS DISEASE [COVID-19]), AMPLIFIED PROBE TECHNIQUE, MAKING USE OF HIGH THROUGHPUT TECHNOLOGIES AS DESCRIBED BY CMS-2020-01-R: HCPCS | Performed by: FAMILY MEDICINE

## 2021-12-30 PROCEDURE — U0005 INFEC AGEN DETEC AMPLI PROBE: HCPCS | Performed by: FAMILY MEDICINE

## 2022-01-12 ENCOUNTER — OFFICE VISIT (OUTPATIENT)
Dept: FAMILY MEDICINE CLINIC | Facility: CLINIC | Age: 27
End: 2022-01-12

## 2022-01-12 VITALS
DIASTOLIC BLOOD PRESSURE: 68 MMHG | WEIGHT: 268.25 LBS | SYSTOLIC BLOOD PRESSURE: 114 MMHG | HEIGHT: 73 IN | TEMPERATURE: 96.9 F | BODY MASS INDEX: 35.55 KG/M2

## 2022-01-12 DIAGNOSIS — F32.9 MAJOR DEPRESSIVE DISORDER, SINGLE EPISODE WITH ANXIOUS DISTRESS: ICD-10-CM

## 2022-01-12 DIAGNOSIS — R40.4 TRANSIENT ALTERATION OF AWARENESS: ICD-10-CM

## 2022-01-12 DIAGNOSIS — F41.1 GENERALIZED ANXIETY DISORDER: ICD-10-CM

## 2022-01-12 DIAGNOSIS — R56.9 PSEUDOSEIZURES (HCC): ICD-10-CM

## 2022-01-12 DIAGNOSIS — Z00.00 HEALTH CARE MAINTENANCE: ICD-10-CM

## 2022-01-12 DIAGNOSIS — E66.01 SEVERE OBESITY (BMI 35.0-39.9) WITH COMORBIDITY (HCC): ICD-10-CM

## 2022-01-12 DIAGNOSIS — E78.1 HYPERTRIGLYCERIDEMIA: ICD-10-CM

## 2022-01-12 DIAGNOSIS — F41.0 PANIC DISORDER WITHOUT AGORAPHOBIA WITH SEVERE PANIC ATTACKS: ICD-10-CM

## 2022-01-12 DIAGNOSIS — F90.9 ADULT ADHD: ICD-10-CM

## 2022-01-12 DIAGNOSIS — E03.9 ACQUIRED HYPOTHYROIDISM: Primary | ICD-10-CM

## 2022-01-12 PROBLEM — Z11.3 SCREENING FOR STDS (SEXUALLY TRANSMITTED DISEASES): Status: RESOLVED | Noted: 2021-10-07 | Resolved: 2022-01-12

## 2022-01-12 PROBLEM — E66.9 OBESITY (BMI 30-39.9): Status: RESOLVED | Noted: 2021-04-27 | Resolved: 2022-01-12

## 2022-01-12 PROCEDURE — 99214 OFFICE O/P EST MOD 30 MIN: CPT | Performed by: FAMILY MEDICINE

## 2022-01-12 NOTE — PATIENT INSTRUCTIONS
Continue present therapy  Low-fat low-cholesterol diet recommended diet exercise weight loss recommended  No will be given blood pressure normal in the office  Return in April as planned sooner if needed

## 2022-01-12 NOTE — PROGRESS NOTES
Assessment and Plan:  1  Hypothyroidism, patient is on levothyroxine 25 mcg daily has up appointment 4/22 for follow-up  2  Pseudoseizures/transient loss of conscious, this was evaluated by Neurology patient has no recurrence  3  Adult ADHD/GA D/MDD/panic, all stable patient to continue Wellbutrin  mg daily  4  BMI 35 39 diet exercise weight loss recommended  5  Hypertriglyceridemia, low-fat low-cholesterol diet recommended  6  Health care maintenance, patient states he does have his COVID vaccination patient to drop office card so we may input that information  [id number] vaccination discussed, handout was given patient declined the present time  7  Elevated blood pressure DOT exam, blood pressure normal at the present time no will be given  8  Patient to return in April at scheduled office visit sooner if needed     Problem List Items Addressed This Visit        Endocrine    Acquired hypothyroidism - Primary     Patient currently on levothyroxine 25 mcg has appointment in 4/22 for follow-up            Nervous and Auditory    Pseudoseizures [facility])     Was evaluated by Neurology asymptomatic at present has had no recurrence            Other    Major depressive disorder, single episode with anxious distress     Stable on Wellbutrin         Health care maintenance     Discussed Gardasil vaccine  Patient declined, handout on HPV vaccine was given         Severe obesity (BMI 35 0-39  9) with comorbidity (HCC)     BMI 35 39 diet exercise weight loss recommended         Panic disorder without agoraphobia with severe panic attacks     Stable on Wellbutrin         Adult ADHD     Stable on Wellbutrin         Generalized anxiety disorder     Stable on Wellbutrin         Hypertriglyceridemia     Low-fat low-cholesterol diet recommended         RESOLVED: Transient alteration of awareness     Resolved                      Diagnoses and all orders for this visit:    Acquired hypothyroidism    Pseudoseizures ([address] )    Adult ADHD    Generalized anxiety disorder    Hypertriglyceridemia    Major depressive disorder, single episode with anxious distress    Panic disorder without agoraphobia with severe panic attacks    Severe obesity (BMI 35 0-39  9) with comorbidity ([address] )    Transient alteration of awareness    Health care maintenance              Subjective:      Patient ID: Marquis Heaton is a 23 y o  male  CC:    Chief Complaint   Patient presents with    Follow-up     for med and pt needs A BP check due to it being high at his DOT physical    mgb       HPI:    Patient is here for follow-up is MDD and anxiety, doing well with bupropion patient denies any symptomatology  Patient states he also needs a blood pressure check as he had a DOT physical stools blood pressure was elevated  The following portions of the patient's history were reviewed and updated as appropriate: allergies, current medications, past family history, past medical history, past social history, past surgical history and problem list       Review of Systems   Constitutional: Negative  HENT: Negative  Eyes: Negative  Respiratory: Negative  Cardiovascular:        HPI   Gastrointestinal: Negative  Endocrine: Negative  Genitourinary: Negative  Musculoskeletal: Negative  Skin: Negative  Allergic/Immunologic: Negative  Neurological: Negative  Hematological: Negative  Psychiatric/Behavioral:        HPI         Data to review:       Objective:    Vitals:    01/12/22 1552   BP: 114/68   BP Location: Right arm   Patient Position: Sitting   Cuff Size: Large   Temp: (!) 96 9 °F (36 1 °C)   TempSrc: Temporal   Weight: 122 kg (268 lb 4 oz)   Height: 6' 1" (1 854 m)        Physical Exam  Vitals and nursing note reviewed  HENT:      Head: Normocephalic and atraumatic  Eyes:      General: No scleral icterus  Neck:      Vascular: No carotid bruit  Cardiovascular:      Rate and Rhythm: Normal rate and regular rhythm        Heart sounds: Normal heart sounds  Pulmonary:      Effort: Pulmonary effort is normal       Breath sounds: Normal breath sounds  Abdominal:      General: Bowel sounds are normal       Palpations: Abdomen is soft  Tenderness: There is no abdominal tenderness  Musculoskeletal:      Cervical back: Neck supple  Right lower leg: No edema  Left lower leg: No edema  Skin:     General: Skin is warm and dry  Neurological:      General: No focal deficit present  Mental Status: He is alert  Psychiatric:         Mood and Affect: Mood normal            BMI Counseling: Body mass index is 35 39 kg/m²  The BMI is above normal  Nutrition recommendations include moderation in carbohydrate intake and reducing intake of cholesterol  Exercise recommendations include exercising 3-5 times per week  Rationale for BMI follow-up plan is due to patient being overweight or obese

## 2022-01-12 NOTE — LETTER
To whom it May concern,     Re: Brynn Gentile ( [birth year])       This patient is a long-time patient of mine  In the office today, 2022, his blood pressure is 114/68            RANDI Askew

## 2022-01-18 ENCOUNTER — HOSPITAL ENCOUNTER (EMERGENCY)
Facility: HOSPITAL | Age: 27
Discharge: HOME | End: 2022-01-18
Attending: EMERGENCY MEDICINE
Payer: OTHER MISCELLANEOUS

## 2022-01-18 ENCOUNTER — APPOINTMENT (EMERGENCY)
Dept: RADIOLOGY | Facility: HOSPITAL | Age: 27
End: 2022-01-18
Payer: OTHER MISCELLANEOUS

## 2022-01-18 VITALS
HEART RATE: 89 BPM | TEMPERATURE: 98.7 F | OXYGEN SATURATION: 97 % | SYSTOLIC BLOOD PRESSURE: 167 MMHG | BODY MASS INDEX: 35.39 KG/M2 | RESPIRATION RATE: 20 BRPM | DIASTOLIC BLOOD PRESSURE: 112 MMHG | HEIGHT: 73 IN

## 2022-01-18 DIAGNOSIS — M25.511 ACUTE PAIN OF RIGHT SHOULDER: Primary | ICD-10-CM

## 2022-01-18 PROCEDURE — 73030 X-RAY EXAM OF SHOULDER: CPT

## 2022-01-18 PROCEDURE — 96372 THER/PROPH/DIAG INJ SC/IM: CPT

## 2022-01-18 PROCEDURE — 99283 EMERGENCY DEPT VISIT LOW MDM: CPT

## 2022-01-18 PROCEDURE — 99284 EMERGENCY DEPT VISIT MOD MDM: CPT | Performed by: EMERGENCY MEDICINE

## 2022-01-18 RX ORDER — IBUPROFEN 600 MG/1
600 TABLET ORAL EVERY 6 HOURS PRN
Qty: 30 TABLET | Refills: 0 | Status: SHIPPED | OUTPATIENT
Start: 2022-01-18 | End: 2022-07-11

## 2022-01-18 RX ORDER — KETOROLAC TROMETHAMINE 30 MG/ML
15 INJECTION, SOLUTION INTRAMUSCULAR; INTRAVENOUS ONCE
Status: COMPLETED | OUTPATIENT
Start: 2022-01-18 | End: 2022-01-18

## 2022-01-18 RX ADMIN — KETOROLAC TROMETHAMINE 15 MG: 30 INJECTION, SOLUTION INTRAMUSCULAR at 06:42

## 2022-01-18 NOTE — ED PROVIDER NOTES
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 and reports cart falling down onto right arm and hearing/ feeling a pop from shoulder  Patient reports difficulty moving arm  Rates pain 8/10     Patient presents to the ED with complaint of right shoulder pain that occurred prior to arrival to the ED  Patient reports he was moving a cart when the cart fell and he held onto the car which pulled his right arm  Patient reports he heard a pop at the time of the incident  He denies deformity however he does report some shoulder pain after the incident  States he had pain with arm movement while getting the x-ray taken however at rest it is not very painful  Denies taking any medication for the pain  Denies coldness in the extremity, paresthesias, anesthesias, numbness, swelling, redness, other injuries, fall, head strike, back pain, neck pain, fever, chills, CP, SOB, focal deficits, gait disturbances, balance issues, abdominal pain, N/V/D, decreased urination and any other complaint  Patient does report a remote history a shoulder dislocation  Denies any shoulder surgeries or complications since  Prior to Admission Medications   Prescriptions Last Dose Informant Patient Reported? Taking?    buPROPion (WELLBUTRIN XL) 300 mg 24 hr tablet 1/17/2022 at Unknown time  No Yes   Sig: Take 1 tablet (300 mg total) by mouth every morning   levothyroxine 25 mcg tablet 1/17/2022 at Unknown time  No Yes   Sig: TAKE 1 TABLET (25 MCG TOTAL) BY MOUTH DAILY IN THE EARLY MORNING      Facility-Administered Medications: None       Past Medical History:   Diagnosis Date    ADHD (attention deficit hyperactivity disorder)     Anxiety        Past Surgical History:   Procedure Laterality Date    INGUINAL HERNIA REPAIR Left     NO PAST SURGERIES         Family History   Problem Relation Age of Onset    No Known Problems Mother     Hypertension Father I have reviewed and agree with the history as documented  E-Cigarette/Vaping     E-Cigarette/Vaping Substances     Social History     Tobacco Use    Smoking status: Never Smoker    Smokeless tobacco: Never Used   Substance Use Topics    Alcohol use: Yes     Comment: occasional    Drug use: No       Review of Systems   Constitutional: Negative for chills and fever  HENT: Negative for ear pain and sore throat  Eyes: Negative for pain and visual disturbance  Respiratory: Negative for cough and shortness of breath  Cardiovascular: Negative for chest pain and palpitations  Gastrointestinal: Negative for abdominal pain, diarrhea, nausea and vomiting  Genitourinary: Negative for dysuria and hematuria  Musculoskeletal: Negative for arthralgias, back pain, neck pain and neck stiffness  Skin: Negative for color change and rash  (+) R shoulder pain   Neurological: Negative for dizziness, seizures, syncope, weakness, light-headedness and headaches  All other systems reviewed and are negative  Physical Exam  Physical Exam  Vitals and nursing note reviewed  Constitutional:       General: He is not in acute distress  Appearance: He is well-developed  HENT:      Head: Normocephalic and atraumatic  Eyes:      Conjunctiva/sclera: Conjunctivae normal    Cardiovascular:      Rate and Rhythm: Normal rate and regular rhythm  Pulses:           Radial pulses are 2+ on the right side and 2+ on the left side  Heart sounds: No murmur heard  Pulmonary:      Effort: Pulmonary effort is normal  No respiratory distress  Breath sounds: Normal breath sounds  Abdominal:      Palpations: Abdomen is soft  Tenderness: There is no abdominal tenderness  Musculoskeletal:      Right shoulder: Tenderness present  No swelling or crepitus  Normal strength        Left shoulder: Normal       Right upper arm: Normal       Left upper arm: Normal       Right elbow: Normal       Left elbow: Normal         Arms:       Cervical back: Normal and neck supple  No spinous process tenderness or muscular tenderness  Thoracic back: Normal       Lumbar back: Normal       Comments: No deformity, erythema, or swelling appreciated  TTP where indicated  Decreased ROM 2/2 to pain  Skin:     General: Skin is warm and dry  Capillary Refill: Capillary refill takes less than 2 seconds  Neurological:      General: No focal deficit present  Mental Status: He is alert and oriented to person, place, and time  GCS: GCS eye subscore is 4  GCS verbal subscore is 5  GCS motor subscore is 6  Cranial Nerves: Cranial nerves are intact  Sensory: Sensation is intact  Motor: Motor function is intact  No weakness  Coordination: Coordination is intact  Gait: Gait is intact  Deep Tendon Reflexes: Reflexes normal       Comments:  Motor and sensation intact in RUE  Strength in RUE 5/5  Vital Signs  ED Triage Vitals [01/18/22 0238]   Temperature Pulse Respirations Blood Pressure SpO2   98 7 °F (37 1 °C) 89 20 (!) 167/112 97 %      Temp Source Heart Rate Source Patient Position - Orthostatic VS BP Location FiO2 (%)   Oral Monitor Sitting Left arm --      Pain Score       8           Vitals:    01/18/22 0238   BP: (!) 167/112   Pulse: 89   Patient Position - Orthostatic VS: Sitting         Visual Acuity      ED Medications  Medications   ketorolac (TORADOL) injection 15 mg (15 mg Intramuscular Given 1/18/22 5074)       Diagnostic Studies  Results Reviewed     None                 XR shoulder 2+ views RIGHT   ED Interpretation by Saloni Reed PA-C (01/18 1845)   No acute osseous abnormality or dislocation appreciated  Final Result by Maryalice Goltz, MD (01/18 2136)      No acute osseous abnormality              Workstation performed: [id number]                    Procedures  Procedures         ED Course                                             MDM  Number of Diagnoses or Management Options     Amount and/or Complexity of Data Reviewed  Tests in the radiology section of CPT®: ordered and reviewed    Risk of Complications, Morbidity, and/or Mortality  General comments: Pt presenting with R shoulder pain  No significant findings on exam  Pt with tenderness over AC joint  AROM and PROM limited 2/2 to pain  RUE otherwise NV intact  XR- No acute osseous abnormality  Pt provided a sling and education to continue to move shoulder as tolerated to prevent adhesive capsilitis  Advised to f/u with PCP and/or ortho for re-evaluation and further management as needed  Strict return precautions verbally communicated to the patient as outlined in the AVS   All patient questions and concerns were answered  Patient verbally communicated their understanding and agreement to the above plan  Patient Progress  Patient progress: stable      Disposition  Final diagnoses:   Acute pain of right shoulder     Time reflects when diagnosis was documented in both MDM as applicable and the Disposition within this note     Time User Action Codes Description Comment    1/18/2022  5:56 AM Bal Naranjo Add [M25 [id number]] Acute pain of right shoulder       ED Disposition     ED Disposition Condition Date/Time Comment    Discharge Stable Tue Jan 18, 2022  5:56 AM Melina Lung discharge to home/self care              Follow-up Information     Follow up With Specialties Details Why Contact Info Additional [address] [facility] [place] [facility] Call today For further evaluation, As needed [address] [phone] [address] [facility] [place], [address], [address] [place], [place], [address] [facility]    Silva Hyman DO Family Medicine Call today For further evaluation, As needed [address]  [address] [facility]  [address]  [phone]             Discharge Medication List as of 1/18/2022  6:03 AM      START taking these medications    Details   ibuprofen (MOTRIN) 600 mg tablet Take 1 tablet (600 mg total) by mouth every 6 (six) hours as needed for mild pain, Starting Tue 1/18/2022, Normal         CONTINUE these medications which have NOT CHANGED    Details   buPROPion (WELLBUTRIN XL) 300 mg 24 hr tablet Take 1 tablet (300 mg total) by mouth every morning, Starting Tue 4/27/2021, Print      levothyroxine 25 mcg tablet TAKE 1 TABLET (25 MCG TOTAL) BY MOUTH DAILY IN THE EARLY MORNING, Starting Thu 11/4/2021, Normal             No discharge procedures on file      PDMP Review       Value Time User    PDMP Reviewed  Yes 11/19/2019 10:35 AM Chon Luna DO          ED Provider  Electronically Signed by           Corey Horne PA-C  01/19/22 2048

## 2022-01-18 NOTE — DISCHARGE INSTRUCTIONS
Please return to ED for any concerns outlined in the AVS or for any other concerns  Please follow-up with your primary care provider or orthopedics at the contact number provided for re-evaluation further management  Continue ibuprofen as needed for pain control  While a shoulder sling may help reduce pain is also important to move the shoulder throughout the day to reduce the risk of frozen shoulder

## 2022-01-21 ENCOUNTER — APPOINTMENT (OUTPATIENT)
Dept: URGENT CARE | Facility: MEDICAL CENTER | Age: 27
End: 2022-01-21

## 2022-01-21 PROCEDURE — 99212 OFFICE O/P EST SF 10 MIN: CPT | Performed by: PHYSICIAN ASSISTANT

## 2022-01-28 ENCOUNTER — APPOINTMENT (OUTPATIENT)
Dept: URGENT CARE | Facility: MEDICAL CENTER | Age: 27
End: 2022-01-28

## 2022-01-28 PROCEDURE — 99213 OFFICE O/P EST LOW 20 MIN: CPT | Performed by: PHYSICIAN ASSISTANT

## 2022-01-31 ENCOUNTER — OFFICE VISIT (OUTPATIENT)
Dept: OBGYN CLINIC | Facility: OTHER | Age: 27
End: 2022-01-31
Payer: OTHER MISCELLANEOUS

## 2022-01-31 VITALS
WEIGHT: 270 LBS | SYSTOLIC BLOOD PRESSURE: 140 MMHG | DIASTOLIC BLOOD PRESSURE: 90 MMHG | BODY MASS INDEX: 36.57 KG/M2 | HEIGHT: 72 IN | HEART RATE: 84 BPM

## 2022-01-31 DIAGNOSIS — S46.911A STRAIN OF RIGHT SHOULDER, INITIAL ENCOUNTER: Primary | ICD-10-CM

## 2022-01-31 PROCEDURE — 99203 OFFICE O/P NEW LOW 30 MIN: CPT | Performed by: ORTHOPAEDIC SURGERY

## 2022-01-31 NOTE — PROGRESS NOTES
Chief Complaint:  Right shoulder pain    HPI:    Amy Goldman is a 23year old Male who presents today for evaluation of right shoulder pain      Injury related: Yes, Patient reports that on 1/18/2022, he was moving a cart when the cart fell and he held onto the cart leading to a pulling sensation of his right arm and a popping of the right shoulder  Description of symptoms:  Patient was seen at the [address] [place] [address] emergency room on the date of injury and a plain radiograph of the right shoulder did not reveal any acute osseous abnormality or significant degenerative changes  He was provided with a ketorolac injection and suggested to take oral ibuprofen for pain control  Today, the patient reports that his right shoulder pain has now fully resolved  He does not have any difficulty moving his right shoulder and does not feel any sense of apprehension in the right shoulder  Denies any sense of tingling numbness or weakness of his right upper extremity  I have personally reviewed pertinent films in PACS and my interpretation is Plain radiograph of the right shoulder performed on 1/18/2022 does not reveal any acute osseous injury  No significant degenerative changes noted       Patient Active Problem List   Diagnosis    Allergic rhinitis    Gilbert's syndrome    Major depressive disorder, single episode with anxious distress    Health care maintenance    Severe obesity (BMI 35 0-39  9) with comorbidity ([facility] )    Pseudoseizures ([place] [address] )    Panic disorder without agoraphobia with severe panic attacks    HSV-2 seropositive    Adult ADHD    Generalized anxiety disorder    Fatigue    Acquired hypothyroidism    Hypertriglyceridemia        Current Outpatient Medications on File Prior to Visit   Medication Sig Dispense Refill    buPROPion (WELLBUTRIN XL) 300 mg 24 hr tablet Take 1 tablet (300 mg total) by mouth every morning 30 tablet 5    ibuprofen (MOTRIN) 600 mg tablet Take 1 tablet (600 mg total) by mouth every 6 (six) hours as needed for mild pain 30 tablet 0    levothyroxine 25 mcg tablet TAKE 1 TABLET (25 MCG TOTAL) BY MOUTH DAILY IN THE EARLY MORNING 30 tablet 2     No current facility-administered medications on file prior to visit  Allergies   Allergen Reactions    Paxil [Paroxetine] Other (See Comments)     When doses greater than 20 mg patient had suicidal ideation    Azithromycin Hives    Cefuroxime Hives and Rash    Penicillins Hives and Rash        Tobacco Use: Low Risk     Smoking Tobacco Use: Never Smoker    Smokeless Tobacco Use: Never Used        Social Determinants of Health     Tobacco Use: Low Risk     Smoking Tobacco Use: Never Smoker    Smokeless Tobacco Use: Never Used   Alcohol Use: Not on file   Financial Resource Strain: Not on file   Food Insecurity: Not on file   Transportation Needs: Not on file   Physical Activity: Not on file   Stress: Not on file   Social Connections: Not on file   Intimate Partner Violence: Not on file   Depression: Not on file   Housing Stability: Not on file               Review of Systems   Constitutional: Negative  HENT: Negative  Eyes: Negative  Respiratory: Negative  Cardiovascular: Negative  Gastrointestinal: Negative  Endocrine: Negative  Genitourinary: Negative  Skin: Negative  Allergic/Immunologic: Negative  Neurological: Negative  Psychiatric/Behavioral: Negative  Body mass index is 36 62 kg/m²  Physical Exam  Vitals and nursing note reviewed  Constitutional:       Appearance: He is well-developed  HENT:      Head: Normocephalic and atraumatic  Eyes:      Conjunctiva/sclera: Conjunctivae normal    Cardiovascular:      Rate and Rhythm: Normal rate and regular rhythm  Heart sounds: No murmur heard  Pulmonary:      Effort: Pulmonary effort is normal  No respiratory distress  Breath sounds: Normal breath sounds  Abdominal:      Palpations: Abdomen is soft        Tenderness: There is no abdominal tenderness  Musculoskeletal:      Cervical back: Neck supple  Skin:     General: Skin is warm and dry  Neurological:      Mental Status: He is alert  Ortho Exam:    Body part: right shoulder    Inspection:  No clinically visible deformity, asymmetry or atrophy    Palpation:  No areas of bony or joint line tenderness of the right shoulder  Range of motion:  Full range of right shoulder motion in all directions without discomfort  Special Tests:  Grade 5/5 strength of right shoulder in all directions  No anterior apprehension  Negative Votaw's  Negative speed's  Negative Yergason's  Negative acromioclavicular Jobes compression test   Negative belly press test   Negative empty can test   Negative Wheeler  Negative Neer's  Distal Neurovascular Status: Intact, Yes    Procedures       Assessment:     Diagnosis ICD-10-CM Associated Orders   1  Strain of right shoulder, initial encounter  B36 [id number]         Plan:    I explained my current clinical findings and reviewed radiological findings with Mary Garay   His right shoulder exam is clinically within normal limits  His right shoulder radiograph of so does not reveal any acute osseous abnormality  Hence, at this time he may return back to all his work duties  Follow-up on an as-needed basis  Portions of the record may have been created with voice recognition software  Occasional wrong word or "sound alike" substitutions may have occurred due to the inherent limitations of voice recognition software  Please review the chart carefully and recognize, using context, where substitutions/typographical errors may have occurred

## 2022-01-31 NOTE — LETTER
January 31, 2022     Patient: Noah Jc   YOB: 1995   Date of Visit: 1/31/2022       To Whom it May Concern:    Nabeel Knight is under my professional care  He was seen in my office on 1/31/2022  He may return to work on 01/31/2022 without restrictions       If you have any questions or concerns, please don't hesitate to call  Sincerely,          Ree Weller MD        CC: Torie Gentile

## 2022-03-23 NOTE — ASSESSMENT & PLAN NOTE
MRI discussed, patient follows up with Podiatry [facility] Antepartum Progress Note    Fransisca Estrada MRN# [id number]   Age: 29 year old  Gestational age: 33w4d YOB: 1993       Date of Admission: 3/22/2022          Subjective:         Pt w/ mild HA, no vis changes, no RUQ pain.  Fetus active, no VB, no SROM, no UCs.          Objective:          Patient Vitals for the past 12 hrs:   BP Temp Temp src Resp SpO2   03/23/22 0720 134/86 97.9  F (36.6  C) Oral -- 98 %   03/23/22 0630 -- -- -- -- 98 %   03/23/22 0600 -- -- -- -- 98 %   03/23/22 0530 -- -- -- -- 99 %   03/23/22 0430 (!) 141/90 -- -- 16 100 %   03/23/22 0045 134/82 -- -- -- 99 %   03/22/22 2300 -- -- -- -- 98 %   03/22/22 2230 129/72 -- -- 16 98 %   03/22/22 2130 135/87 98.1  F (36.7  C) Oral 16 99 %   03/22/22 2100 -- -- -- -- 99 %   03/22/22 2047 -- -- -- -- 97 %   03/22/22 2030 -- -- -- -- 99 %     Temp:  [97.9  F (36.6  C)-98.7  F (37.1  C)] 97.9  F (36.6  C)  Resp:  [16] 16  BP: (111-186)/() 134/86  SpO2:  [94 %-100 %] 98 %      Gen: Well-appearing, NAD  Abdomen: Gravid, Soft, Non-tender   LE: No edema, nontender.    LABS (Last ten results)    Recent Labs   Lab 03/23/22  0617 03/22/22  1159   WBC 7.3 7.1   HGB 9.4* 11.2*   HCT 29.0* 34.3*    195        Recent Labs   Lab 03/23/22  0617 03/22/22  1158   AST 11 15   ALT 11 16   CR 0.62 0.78   MAG 5.3*  --      Results for FRANSISCA ESTRADA (MRN [id number]) as of 3/23/2022 08:23   Ref. Range 3/22/2022 11:59   Protein Total Urine g/gr Creatinine Latest Ref Range: 0.00 - 0.20 g/g Cr 0.51 (H)             Assessment/Plan:          1) IUP at 33w4d    2) Preeclampsia w/ severe features - still w/ mild HA, BPs improved while on Mg sulfate, no anti-HTN Rx required since initial Nifed 20 mg po yesterday prior Mg being started, labs normal except for elev Ur Prot/Creat.  Has MFM appt Friday for BPP.  Assuming [facility] [place] does not recommend delivery at that time, the plan is to continue Mg sulfate for 24 hours (which  ends at 5PM today) then stop.  If BPs worsen requiring anti-HTN Rx, HA worsens, or labs show abnl findings, then we'll move towards delivery at that time.  Will restart Mg sulfate in that event. Plan as outlined was reviewed w/ Dr. Scott of [facility] who concurs.    3) Type 1 IDDM (Class D) - on insulin pump w/ BS's adequately controlled.    4) Anemia complicating pregnancy in 3rd trimester - possibly dilutional from IVFs, no sx's of blood loss, no sx's, will hold off on Rx at this time.    5) LGA by [facility] U/S 3/22/2022 - EFW 2766 g (96%) w/ AC 93%.    6) I spent 25 minutes of which more than >50% was face to face with patient and unit/ floor time spent in patient care/coordinating care, and discussion with other health care providers regarding the patient's care.       Papo Dominique MD

## 2022-07-11 ENCOUNTER — OFFICE VISIT (OUTPATIENT)
Dept: FAMILY MEDICINE CLINIC | Facility: CLINIC | Age: 27
End: 2022-07-11

## 2022-07-11 ENCOUNTER — TELEPHONE (OUTPATIENT)
Dept: FAMILY MEDICINE CLINIC | Facility: CLINIC | Age: 27
End: 2022-07-11

## 2022-07-11 VITALS
HEART RATE: 84 BPM | WEIGHT: 266 LBS | SYSTOLIC BLOOD PRESSURE: 124 MMHG | BODY MASS INDEX: 36.03 KG/M2 | OXYGEN SATURATION: 98 % | HEIGHT: 72 IN | DIASTOLIC BLOOD PRESSURE: 72 MMHG | RESPIRATION RATE: 16 BRPM | TEMPERATURE: 97.7 F

## 2022-07-11 DIAGNOSIS — J30.9 ALLERGIC RHINITIS, UNSPECIFIED SEASONALITY, UNSPECIFIED TRIGGER: ICD-10-CM

## 2022-07-11 DIAGNOSIS — E03.9 ACQUIRED HYPOTHYROIDISM: Primary | ICD-10-CM

## 2022-07-11 DIAGNOSIS — J02.9 SORE THROAT: ICD-10-CM

## 2022-07-11 DIAGNOSIS — F32.9 MAJOR DEPRESSIVE DISORDER, SINGLE EPISODE WITH ANXIOUS DISTRESS: ICD-10-CM

## 2022-07-11 DIAGNOSIS — H66.003 ACUTE SUPPURATIVE OTITIS MEDIA OF BOTH EARS WITHOUT SPONTANEOUS RUPTURE OF TYMPANIC MEMBRANES, RECURRENCE NOT SPECIFIED: ICD-10-CM

## 2022-07-11 DIAGNOSIS — L04.0 ACUTE CERVICAL ADENITIS: ICD-10-CM

## 2022-07-11 DIAGNOSIS — F90.9 ADULT ADHD: ICD-10-CM

## 2022-07-11 DIAGNOSIS — E78.1 HYPERTRIGLYCERIDEMIA: ICD-10-CM

## 2022-07-11 DIAGNOSIS — R56.9 PSEUDOSEIZURES (HCC): ICD-10-CM

## 2022-07-11 DIAGNOSIS — H69.80 DYSFUNCTION OF EUSTACHIAN TUBE, UNSPECIFIED LATERALITY: ICD-10-CM

## 2022-07-11 DIAGNOSIS — F41.0 PANIC DISORDER WITHOUT AGORAPHOBIA WITH SEVERE PANIC ATTACKS: ICD-10-CM

## 2022-07-11 DIAGNOSIS — F41.1 GENERALIZED ANXIETY DISORDER: ICD-10-CM

## 2022-07-11 DIAGNOSIS — E03.9 HYPOTHYROIDISM, UNSPECIFIED TYPE: ICD-10-CM

## 2022-07-11 PROBLEM — H69.90 DYSFUNCTION OF EUSTACHIAN TUBE: Status: ACTIVE | Noted: 2022-07-11

## 2022-07-11 LAB — S PYO AG THROAT QL: NEGATIVE

## 2022-07-11 PROCEDURE — 87636 SARSCOV2 & INF A&B AMP PRB: CPT | Performed by: FAMILY MEDICINE

## 2022-07-11 PROCEDURE — 87880 STREP A ASSAY W/OPTIC: CPT | Performed by: FAMILY MEDICINE

## 2022-07-11 PROCEDURE — 99215 OFFICE O/P EST HI 40 MIN: CPT | Performed by: FAMILY MEDICINE

## 2022-07-11 RX ORDER — DOXYCYCLINE HYCLATE 100 MG/1
100 CAPSULE ORAL EVERY 12 HOURS SCHEDULED
Qty: 20 CAPSULE | Refills: 0 | Status: SHIPPED | OUTPATIENT
Start: 2022-07-11 | End: 2022-07-21

## 2022-07-11 RX ORDER — AZELASTINE 1 MG/ML
2 SPRAY, METERED NASAL 2 TIMES DAILY
Qty: 30 ML | Refills: 3 | Status: SHIPPED | OUTPATIENT
Start: 2022-07-11

## 2022-07-11 RX ORDER — LEVOTHYROXINE SODIUM 0.03 MG/1
25 TABLET ORAL
Qty: 30 TABLET | Refills: 2 | Status: SHIPPED | OUTPATIENT
Start: 2022-07-11

## 2022-07-11 NOTE — TELEPHONE ENCOUNTER
Pt saw Dr Ayan Collins today and was ordered 3 medications  Pt does not have insurance and the prices at the pharmacy were much higher than the estimated prices on his After Visit Summary  Is there something cheaper pt could have? I did let pt know about [facility] and trying to call other pharmacies as well to see if there was anything cheaper  Please advise  Thank you!

## 2022-07-11 NOTE — PATIENT INSTRUCTIONS
I recommend remaining at home in isolation to result of COVID test is known   Finish doxycycline    This may cause sunburn avoid sun exposure  Use Astelin nasal spray 2 sprays both nostrils twice daily for allergy symptoms/head congestion  Restart levothyroxine 25 mcg daily  Return in 3 months for office visit blood work sooner if needed  Return in 1 week if still symptoms

## 2022-07-11 NOTE — PROGRESS NOTES
Assessment and Plan:  1  Sore throat, rapid strep negative, COVID test sent to lab remain in isolation to result of COVID testing is known  2  Per allergic rhinitis  3  Eustachian tube dysfunction  4  Acute otitis media with cervical adenitis  Doxycycline is ordered, patient is allergic to Zithromax and cephalosporins, photo toxicity discussed  Astelin nasal spray ordered  5  Pseudoseizures, asymptomatic follows with psychiatry  6  Generalized anxiety/panic disorder  7  Adult ADHD  8  MDD, stable   patient is stable on Wellbutrin following with Psychiatry   9  Hypothyroidism, patient will restart his levothyroxine  10  BMI 36 08 diet exercise weight loss recommended  11  Hypertriglyceridemia, blood work ordered  12   If still symptoms patient should return in 1 week otherwise return in 3 months for office visit blood work sooner if needed    Problem List Items Addressed This Visit        Endocrine    Acquired hypothyroidism - Primary    Relevant Medications    levothyroxine 25 mcg tablet    Other Relevant Orders    CBC    Comprehensive metabolic panel    Lipid Panel with Direct LDL reflex    TSH, 3rd generation with Free T4 reflex    UA (URINE) with reflex to Scope    T4       Respiratory    Allergic rhinitis    Relevant Medications    azelastine (ASTELIN) 0 1 % nasal spray    Other Relevant Orders    Covid/Flu- Office Collect    CBC    Comprehensive metabolic panel    Lipid Panel with Direct LDL reflex    TSH, 3rd generation with Free T4 reflex    UA (URINE) with reflex to Scope    T4       Nervous and Auditory    Pseudoseizures (HCC)    Relevant Orders    CBC    Comprehensive metabolic panel    Lipid Panel with Direct LDL reflex    TSH, 3rd generation with Free T4 reflex    UA (URINE) with reflex to Scope    T4    Dysfunction of eustachian tube    Relevant Medications    azelastine (ASTELIN) 0 1 % nasal spray    Other Relevant Orders    CBC    Comprehensive metabolic panel    Lipid Panel with Direct LDL reflex TSH, 3rd generation with Free T4 reflex    UA (URINE) with reflex to Scope    T4       Other    Major depressive disorder, single episode with anxious distress    Relevant Orders    CBC    Comprehensive metabolic panel    Lipid Panel with Direct LDL reflex    TSH, 3rd generation with Free T4 reflex    UA (URINE) with reflex to Scope    T4    Panic disorder without agoraphobia with severe panic attacks    Relevant Orders    CBC    Comprehensive metabolic panel    Lipid Panel with Direct LDL reflex    TSH, 3rd generation with Free T4 reflex    UA (URINE) with reflex to Scope    T4    Adult ADHD    Relevant Orders    CBC    Comprehensive metabolic panel    Lipid Panel with Direct LDL reflex    TSH, 3rd generation with Free T4 reflex    UA (URINE) with reflex to Scope    T4    Generalized anxiety disorder    Relevant Orders    CBC    Comprehensive metabolic panel    Lipid Panel with Direct LDL reflex    TSH, 3rd generation with Free T4 reflex    UA (URINE) with reflex to Scope    T4    Hypertriglyceridemia     Blood work ordered           Relevant Orders    CBC    Comprehensive metabolic panel    Lipid Panel with Direct LDL reflex    TSH, 3rd generation with Free T4 reflex    UA (URINE) with reflex to Scope    T4      Other Visit Diagnoses     Sore throat        Relevant Orders    POCT rapid strepA (Completed)    Covid/Flu- Office Collect    CBC    Comprehensive metabolic panel    Lipid Panel with Direct LDL reflex    TSH, 3rd generation with Free T4 reflex    UA (URINE) with reflex to Scope    T4    Hypothyroidism, unspecified type        Relevant Medications    levothyroxine 25 mcg tablet    Other Relevant Orders    CBC    Comprehensive metabolic panel    Lipid Panel with Direct LDL reflex    TSH, 3rd generation with Free T4 reflex    UA (URINE) with reflex to Scope    T4    Acute suppurative otitis media of both ears without spontaneous rupture of tympanic membranes, recurrence not specified        Relevant Medications    doxycycline hyclate (VIBRAMYCIN) 100 mg capsule    Other Relevant Orders    CBC    Comprehensive metabolic panel    Lipid Panel with Direct LDL reflex    TSH, 3rd generation with Free T4 reflex    UA (URINE) with reflex to Scope    T4    Acute cervical adenitis        Relevant Medications    doxycycline hyclate (VIBRAMYCIN) 100 mg capsule    Other Relevant Orders    CBC    Comprehensive metabolic panel    Lipid Panel with Direct LDL reflex    TSH, 3rd generation with Free T4 reflex    UA (URINE) with reflex to Scope    T4                 Diagnoses and all orders for this visit:    Acquired hypothyroidism  -     CBC; Future  -     Comprehensive metabolic panel; Future  -     Lipid Panel with Direct LDL reflex; Future  -     TSH, 3rd generation with Free T4 reflex; Future  -     UA (URINE) with reflex to Scope; Future  -     T4; Future    Allergic rhinitis, unspecified seasonality, unspecified trigger  -     Covid/Flu- Office Collect  -     azelastine (ASTELIN) 0 1 % nasal spray; 2 sprays into each nostril 2 (two) times a day Use in each nostril as directed  -     CBC; Future  -     Comprehensive metabolic panel; Future  -     Lipid Panel with Direct LDL reflex; Future  -     TSH, 3rd generation with Free T4 reflex; Future  -     UA (URINE) with reflex to Scope; Future  -     T4; Future    Pseudoseizures (HCC)  -     CBC; Future  -     Comprehensive metabolic panel; Future  -     Lipid Panel with Direct LDL reflex; Future  -     TSH, 3rd generation with Free T4 reflex; Future  -     UA (URINE) with reflex to Scope; Future  -     T4; Future    Generalized anxiety disorder  -     CBC; Future  -     Comprehensive metabolic panel; Future  -     Lipid Panel with Direct LDL reflex; Future  -     TSH, 3rd generation with Free T4 reflex; Future  -     UA (URINE) with reflex to Scope; Future  -     T4; Future    Adult ADHD  -     CBC; Future  -     Comprehensive metabolic panel;  Future  -     Lipid Panel with Direct LDL reflex; Future  -     TSH, 3rd generation with Free T4 reflex; Future  -     UA (URINE) with reflex to Scope; Future  -     T4; Future    Hypertriglyceridemia  -     CBC; Future  -     Comprehensive metabolic panel; Future  -     Lipid Panel with Direct LDL reflex; Future  -     TSH, 3rd generation with Free T4 reflex; Future  -     UA (URINE) with reflex to Scope; Future  -     T4; Future    Major depressive disorder, single episode with anxious distress  -     CBC; Future  -     Comprehensive metabolic panel; Future  -     Lipid Panel with Direct LDL reflex; Future  -     TSH, 3rd generation with Free T4 reflex; Future  -     UA (URINE) with reflex to Scope; Future  -     T4; Future    Panic disorder without agoraphobia with severe panic attacks  -     CBC; Future  -     Comprehensive metabolic panel; Future  -     Lipid Panel with Direct LDL reflex; Future  -     TSH, 3rd generation with Free T4 reflex; Future  -     UA (URINE) with reflex to Scope; Future  -     T4; Future    Sore throat  -     POCT rapid strepA  -     Covid/Flu- Office Collect  -     CBC; Future  -     Comprehensive metabolic panel; Future  -     Lipid Panel with Direct LDL reflex; Future  -     TSH, 3rd generation with Free T4 reflex; Future  -     UA (URINE) with reflex to Scope; Future  -     T4; Future    Hypothyroidism, unspecified type  -     levothyroxine 25 mcg tablet; Take 1 tablet (25 mcg total) by mouth daily in the early morning  -     CBC; Future  -     Comprehensive metabolic panel; Future  -     Lipid Panel with Direct LDL reflex; Future  -     TSH, 3rd generation with Free T4 reflex; Future  -     UA (URINE) with reflex to Scope; Future  -     T4; Future    Acute suppurative otitis media of both ears without spontaneous rupture of tympanic membranes, recurrence not specified  -     doxycycline hyclate (VIBRAMYCIN) 100 mg capsule;  Take 1 capsule (100 mg total) by mouth every 12 (twelve) hours for 10 days  -     CBC; Future  -     Comprehensive metabolic panel; Future  -     Lipid Panel with Direct LDL reflex; Future  -     TSH, 3rd generation with Free T4 reflex; Future  -     UA (URINE) with reflex to Scope; Future  -     T4; Future    Dysfunction of Eustachian tube, unspecified laterality  -     azelastine (ASTELIN) 0 1 % nasal spray; 2 sprays into each nostril 2 (two) times a day Use in each nostril as directed  -     CBC; Future  -     Comprehensive metabolic panel; Future  -     Lipid Panel with Direct LDL reflex; Future  -     TSH, 3rd generation with Free T4 reflex; Future  -     UA (URINE) with reflex to Scope; Future  -     T4; Future    Acute cervical adenitis  -     doxycycline hyclate (VIBRAMYCIN) 100 mg capsule; Take 1 capsule (100 mg total) by mouth every 12 (twelve) hours for 10 days  -     CBC; Future  -     Comprehensive metabolic panel; Future  -     Lipid Panel with Direct LDL reflex; Future  -     TSH, 3rd generation with Free T4 reflex; Future  -     UA (URINE) with reflex to Scope; Future  -     T4; Future            Subjective:      Patient ID: Shila Gregory is a 33 y o  male  CC:    Chief Complaint   Patient presents with    Sore Throat     Pt here with sore throat since this morning  Pt didn't do a home test  Rcruz       HPI:    The past 2 days patient has had some head congestion sneezing some rhinorrhea negative cough no fever chills patient started today with sore throat  Patient did not do home COVID testing  Patient is on vaccinated      The following portions of the patient's history were reviewed and updated as appropriate: allergies, current medications, past family history, past medical history, past social history, past surgical history and problem list       Review of Systems   Constitutional:        HPI   HENT:        HPI   Eyes: Negative  Respiratory: Negative  Cardiovascular: Negative  Gastrointestinal: Negative  Endocrine:        Patient not sure he    Levothyroxine will restart   Genitourinary: Negative  Musculoskeletal: Negative  Skin: Negative  Allergic/Immunologic: Positive for environmental allergies  Neurological: Negative  Hematological: Negative  Psychiatric/Behavioral: Negative  Data to review:       Objective:    Vitals:    07/11/22 1019   BP: 124/72   BP Location: Right arm   Patient Position: Sitting   Cuff Size: Large   Pulse: 84   Resp: 16   Temp: 97 7 °F (36 5 °C)   TempSrc: Temporal   SpO2: 98%   Weight: 121 kg (266 lb)   Height: 6' (1 829 m)        Physical Exam  Vitals and nursing note reviewed  Constitutional:       Appearance: Normal appearance  HENT:      Head: Normocephalic and atraumatic  Ears:      Comments: Both tympanic membranes dull with mild injection     Nose:      Comments: Positive allergic turbinates     Mouth/Throat:      Comments: Clear postnasal drip minimal pharyngeal injection negative exudate  Eyes:      General: No scleral icterus  Neck:      Comments: Positive tender anterior cervical adenopathy  Cardiovascular:      Rate and Rhythm: Normal rate and regular rhythm  Heart sounds: Normal heart sounds  Pulmonary:      Effort: Pulmonary effort is normal       Breath sounds: Normal breath sounds  Musculoskeletal:      Cervical back: Neck supple  Lymphadenopathy:      Cervical: Cervical adenopathy present  Skin:     General: Skin is warm and dry  Neurological:      General: No focal deficit present  Mental Status: He is alert     Psychiatric:         Mood and Affect: Mood normal

## 2022-07-12 LAB
FLUAV RNA RESP QL NAA+PROBE: NEGATIVE
FLUBV RNA RESP QL NAA+PROBE: NEGATIVE
SARS-COV-2 RNA RESP QL NAA+PROBE: NEGATIVE

## 2022-10-17 ENCOUNTER — TELEPHONE (OUTPATIENT)
Dept: FAMILY MEDICINE CLINIC | Facility: CLINIC | Age: 27
End: 2022-10-17

## 2022-10-17 NOTE — TELEPHONE ENCOUNTER
Patient did not show for scheduled appointment today, 10/17/2022  Please call patient have him complete his blood work and reschedule follow-up    In addition, if he does not have insurance please let me know I will gladly consult social service to see if we can be any assistance to him

## 2022-10-19 NOTE — TELEPHONE ENCOUNTER
LMOM for patient to call to reschedule his appt  With Dr Shiloh Baez and to have his BW done prior to his visit

## 2023-02-14 ENCOUNTER — OFFICE VISIT (OUTPATIENT)
Dept: FAMILY MEDICINE CLINIC | Facility: CLINIC | Age: 28
End: 2023-02-14

## 2023-02-14 VITALS
BODY MASS INDEX: 34.13 KG/M2 | OXYGEN SATURATION: 99 % | SYSTOLIC BLOOD PRESSURE: 118 MMHG | HEART RATE: 74 BPM | DIASTOLIC BLOOD PRESSURE: 76 MMHG | HEIGHT: 72 IN | WEIGHT: 252 LBS | TEMPERATURE: 97.7 F

## 2023-02-14 DIAGNOSIS — A08.4 VIRAL GASTROENTERITIS: Primary | ICD-10-CM

## 2023-02-14 DIAGNOSIS — R56.9 PSEUDOSEIZURES (HCC): ICD-10-CM

## 2023-02-14 RX ORDER — ONDANSETRON 4 MG/1
4 TABLET, ORALLY DISINTEGRATING ORAL EVERY 6 HOURS PRN
Qty: 20 TABLET | Refills: 0 | Status: SHIPPED | OUTPATIENT
Start: 2023-02-14

## 2023-02-14 RX ORDER — DICYCLOMINE HYDROCHLORIDE 10 MG/1
10 CAPSULE ORAL 4 TIMES DAILY PRN
Qty: 30 CAPSULE | Refills: 0 | Status: SHIPPED | OUTPATIENT
Start: 2023-02-14

## 2023-02-14 NOTE — ASSESSMENT & PLAN NOTE
Patient denies any current complications regarding this and reports that he has had no symptoms related to this in many years

## 2023-02-14 NOTE — PROGRESS NOTES
Name: Ella Mandujano      : 1995      MRN: [id number]  Encounter Provider: KEMAR Pina  Encounter Date: 2023   Encounter department: [place] [address]  Viral gastroenteritis  Assessment & Plan:  Patient symptoms are consistent with viral gastroenteritis  Zofran was ordered to be used as needed every 6 hours for nausea and vomiting  Bentyl was also ordered to be used as needed for any developing abdominal pain  Patient was advised to increase his water intake to prevent dehydration  He was also advised to follow the brat diet for nutrition  Patient was advised that his symptoms should resolve in the next 48 to 72 hours on their own  Orders:  -     ondansetron (Zofran ODT) 4 mg disintegrating tablet; Take 1 tablet (4 mg total) by mouth every 6 (six) hours as needed for nausea or vomiting  -     dicyclomine (BENTYL) 10 mg capsule; Take 1 capsule (10 mg total) by mouth 4 (four) times a day as needed (Abdominal pain )    2  Pseudoseizures [facility])  Assessment & Plan:  Patient denies any current complications regarding this and reports that he has had no symptoms related to this in many years  BMI Counseling: Body mass index is 34 18 kg/m²  The BMI is above normal  Nutrition recommendations include decreasing portion sizes, encouraging healthy choices of fruits and vegetables, moderation in carbohydrate intake and increasing intake of lean protein  Exercise recommendations include exercising 3-5 times per week and obtaining a gym membership  No pharmacotherapy was ordered  Rationale for BMI follow-up plan is due to patient being overweight or obese  Subjective      Viral gastroenteritis: Patient reports that starting at about 1:00 AM he began to have symptoms of nausea, vomiting, and diarrhea  Patient reports that he has had about 2-3 episodes of vomiting since early this morning and about 5-6 episodes of diarrhea    He denies noting any melena or hematochezia and states that his stool is completely liquid at this point  Patient denies any associated abdominal pain  Patient denies fever, chills, or any other URI symptoms  Patient did complete a home COVID test earlier today which was negative  Pseudoseizures: Patient had follow-up with neurology regarding this in the past but no further interventions were noted to be necessary  Patient has remained asymptomatic for some time now  Review of Systems   Constitutional: Negative for chills, fatigue and fever  HENT: Negative for congestion, ear pain, postnasal drip, rhinorrhea, sinus pressure, sinus pain and sore throat  Eyes: Negative for pain and visual disturbance  Respiratory: Negative for cough, chest tightness, shortness of breath and wheezing  Cardiovascular: Negative for chest pain, palpitations and leg swelling  Gastrointestinal: Positive for diarrhea, nausea and vomiting  Negative for abdominal pain, blood in stool and constipation  Endocrine: Negative for cold intolerance and heat intolerance  Genitourinary: Negative for decreased urine volume, dysuria and hematuria  Musculoskeletal: Negative for arthralgias, back pain and myalgias  Skin: Negative for color change and rash  Allergic/Immunologic: Negative for environmental allergies  Neurological: Negative for dizziness, seizures, syncope, weakness, light-headedness, numbness and headaches  Hematological: Negative for adenopathy  Psychiatric/Behavioral: Negative for confusion  The patient is not nervous/anxious  All other systems reviewed and are negative        Current Outpatient Medications on File Prior to Visit   Medication Sig   • buPROPion (WELLBUTRIN XL) 300 mg 24 hr tablet Take 1 tablet (300 mg total) by mouth every morning   • levothyroxine 25 mcg tablet Take 1 tablet (25 mcg total) by mouth daily in the early morning   • azelastine (ASTELIN) 0 1 % nasal spray 2 sprays into each nostril 2 (two) times a day Use in each nostril as directed (Patient not taking: Reported on 2/14/2023)       Objective     /76 (BP Location: Right arm, Patient Position: Sitting, Cuff Size: Large)   Pulse 74   Temp 97 7 °F (36 5 °C) (Tympanic)   Ht 6' (1 829 m)   Wt 114 kg (252 lb)   SpO2 99%   BMI 34 18 kg/m²     Physical Exam  Vitals and nursing note reviewed  Constitutional:       General: He is not in acute distress  Appearance: Normal appearance  He is not ill-appearing  HENT:      Head: Normocephalic  Eyes:      Conjunctiva/sclera: Conjunctivae normal    Cardiovascular:      Rate and Rhythm: Normal rate and regular rhythm  Pulses: Normal pulses  Carotid pulses are 2+ on the right side and 2+ on the left side  Radial pulses are 2+ on the right side and 2+ on the left side  Posterior tibial pulses are 2+ on the right side and 2+ on the left side  Heart sounds: Normal heart sounds  No murmur heard  Pulmonary:      Effort: Pulmonary effort is normal  No respiratory distress  Breath sounds: Normal breath sounds  No wheezing, rhonchi or rales  Abdominal:      General: Abdomen is flat  Bowel sounds are normal  There is no distension  Palpations: Abdomen is soft  Tenderness: There is no abdominal tenderness  There is no guarding  Musculoskeletal:         General: Normal range of motion  Cervical back: Normal range of motion  Right lower leg: No edema  Left lower leg: No edema  Skin:     General: Skin is warm and dry  Capillary Refill: Capillary refill takes less than 2 seconds  Neurological:      General: No focal deficit present  Mental Status: He is alert and oriented to person, place, and time  Psychiatric:         Mood and Affect: Mood normal          Behavior: Behavior normal          Thought Content:  Thought content normal          Judgment: Judgment normal        Hazelhurst Neither, CRNP

## 2023-02-14 NOTE — ASSESSMENT & PLAN NOTE
Patient symptoms are consistent with viral gastroenteritis  Zofran was ordered to be used as needed every 6 hours for nausea and vomiting  Bentyl was also ordered to be used as needed for any developing abdominal pain  Patient was advised to increase his water intake to prevent dehydration  He was also advised to follow the brat diet for nutrition  Patient was advised that his symptoms should resolve in the next 48 to 72 hours on their own

## 2023-04-15 PROBLEM — A08.4 VIRAL GASTROENTERITIS: Status: RESOLVED | Noted: 2020-01-21 | Resolved: 2023-04-15

## 2023-06-19 ENCOUNTER — OFFICE VISIT (OUTPATIENT)
Dept: FAMILY MEDICINE CLINIC | Facility: CLINIC | Age: 28
End: 2023-06-19
Payer: OTHER MISCELLANEOUS

## 2023-06-19 VITALS
HEART RATE: 84 BPM | DIASTOLIC BLOOD PRESSURE: 84 MMHG | BODY MASS INDEX: 31.42 KG/M2 | HEIGHT: 72 IN | SYSTOLIC BLOOD PRESSURE: 110 MMHG | WEIGHT: 232 LBS

## 2023-06-19 DIAGNOSIS — E78.1 HYPERTRIGLYCERIDEMIA: ICD-10-CM

## 2023-06-19 DIAGNOSIS — E80.4 GILBERT'S SYNDROME: ICD-10-CM

## 2023-06-19 DIAGNOSIS — F44.5 PSYCHOGENIC NONEPILEPTIC SEIZURE: ICD-10-CM

## 2023-06-19 DIAGNOSIS — R00.2 PALPITATIONS: ICD-10-CM

## 2023-06-19 DIAGNOSIS — E03.9 HYPOTHYROIDISM, UNSPECIFIED TYPE: ICD-10-CM

## 2023-06-19 DIAGNOSIS — F90.9 ADULT ADHD: ICD-10-CM

## 2023-06-19 DIAGNOSIS — E03.9 ACQUIRED HYPOTHYROIDISM: ICD-10-CM

## 2023-06-19 DIAGNOSIS — F41.1 GENERALIZED ANXIETY DISORDER: ICD-10-CM

## 2023-06-19 DIAGNOSIS — E66.9 OBESITY (BMI 30-39.9): ICD-10-CM

## 2023-06-19 DIAGNOSIS — J30.9 ALLERGIC RHINITIS, UNSPECIFIED SEASONALITY, UNSPECIFIED TRIGGER: ICD-10-CM

## 2023-06-19 DIAGNOSIS — H69.80 DYSFUNCTION OF EUSTACHIAN TUBE, UNSPECIFIED LATERALITY: ICD-10-CM

## 2023-06-19 DIAGNOSIS — F32.9 MAJOR DEPRESSIVE DISORDER, SINGLE EPISODE WITH ANXIOUS DISTRESS: ICD-10-CM

## 2023-06-19 DIAGNOSIS — F41.0 PANIC DISORDER WITHOUT AGORAPHOBIA WITH SEVERE PANIC ATTACKS: Primary | ICD-10-CM

## 2023-06-19 PROBLEM — E66.01 SEVERE OBESITY (BMI 35.0-39.9) WITH COMORBIDITY (HCC): Status: RESOLVED | Noted: 2018-12-10 | Resolved: 2023-06-19

## 2023-06-19 PROCEDURE — 99214 OFFICE O/P EST MOD 30 MIN: CPT | Performed by: FAMILY MEDICINE

## 2023-06-19 RX ORDER — BUPROPION HYDROCHLORIDE 300 MG/1
300 TABLET ORAL EVERY MORNING
Qty: 30 TABLET | Refills: 5 | Status: SHIPPED | OUTPATIENT
Start: 2023-06-19

## 2023-06-19 RX ORDER — LEVOTHYROXINE SODIUM 0.03 MG/1
25 TABLET ORAL
Qty: 30 TABLET | Refills: 5 | Status: SHIPPED | OUTPATIENT
Start: 2023-06-19

## 2023-06-19 NOTE — PATIENT INSTRUCTIONS
Start bupropion  Restart levothyroxine  Continue diet exercise and weight loss  Return in 4 weeks for office visit and blood work sooner if needed [facility]

## 2023-06-19 NOTE — PROGRESS NOTES
Name: Crispin Valdivia      : 1995      MRN: [id number]  Encounter Provider: Jonna Arriola DO  Encounter Date: 2023   Encounter department: [place] [address]     1  Panic attacks/palpitation  Was seen at [facility] ER yesterday blood work and EKG were normal   Explained to patient I believe this is a result of him stopping his Wellbutrin  2  Anxiety/MDD, restart bupropion  3  Hypertriglyceridemia, blood work ordered  4  Gilbert's syndrome blood work ordered  5  Hypothyroidism, reorder levothyroxine blood work ordered  6  Pseudoseizures, asymptomatic at the present time patient was seen by neurology the past  7  BMI 31 46 patient lost 4 pounds continue diet exercise and weight loss  8  Pure allergic rhinitis/eustachian tube dysfunction, doing well patient has discontinued Astelin  9  Return in 4 weeks for routine follow-up and blood work sooner if needed  1  Panic disorder without agoraphobia with severe panic attacks  Assessment & Plan:  Restart bupropion    Orders:  -     buPROPion (WELLBUTRIN XL) 300 mg 24 hr tablet; Take 1 tablet (300 mg total) by mouth every morning  -     CBC; Future; Expected date: 07/10/2023  -     Comprehensive metabolic panel; Future; Expected date: 07/10/2023  -     Lipid Panel with Direct LDL reflex; Future; Expected date: 07/10/2023  -     TSH, 3rd generation with Free T4 reflex; Future; Expected date: 07/10/2023  -     T4; Future; Expected date: 07/10/2023  -     UA (URINE) with reflex to Scope; Future; Expected date: 07/10/2023    2  Major depressive disorder, single episode with anxious distress  Assessment & Plan:  Restart bupropion    Orders:  -     buPROPion (WELLBUTRIN XL) 300 mg 24 hr tablet; Take 1 tablet (300 mg total) by mouth every morning  -     CBC; Future; Expected date: 07/10/2023  -     Comprehensive metabolic panel; Future; Expected date: 07/10/2023  -     Lipid Panel with Direct LDL reflex;  Future; Expected date: 07/10/2023  -     TSH, 3rd generation with Free T4 reflex; Future; Expected date: 07/10/2023  -     T4; Future; Expected date: 07/10/2023  -     UA (URINE) with reflex to Scope; Future; Expected date: 07/10/2023    3  Hypertriglyceridemia  Assessment & Plan:  Blood work ordered    Orders:  -     CBC; Future; Expected date: 07/10/2023  -     Comprehensive metabolic panel; Future; Expected date: 07/10/2023  -     Lipid Panel with Direct LDL reflex; Future; Expected date: 07/10/2023  -     TSH, 3rd generation with Free T4 reflex; Future; Expected date: 07/10/2023  -     T4; Future; Expected date: 07/10/2023  -     UA (URINE) with reflex to Scope; Future; Expected date: 07/10/2023    4  Gilbert's syndrome  Assessment & Plan:  Monitor bilirubin    Orders:  -     CBC; Future; Expected date: 07/10/2023  -     Comprehensive metabolic panel; Future; Expected date: 07/10/2023  -     Lipid Panel with Direct LDL reflex; Future; Expected date: 07/10/2023  -     TSH, 3rd generation with Free T4 reflex; Future; Expected date: 07/10/2023  -     T4; Future; Expected date: 07/10/2023  -     UA (URINE) with reflex to Scope; Future; Expected date: 07/10/2023    5  Generalized anxiety disorder  Assessment & Plan:  Start Wellbutrin    Orders:  -     buPROPion (WELLBUTRIN XL) 300 mg 24 hr tablet; Take 1 tablet (300 mg total) by mouth every morning  -     CBC; Future; Expected date: 07/10/2023  -     Comprehensive metabolic panel; Future; Expected date: 07/10/2023  -     Lipid Panel with Direct LDL reflex; Future; Expected date: 07/10/2023  -     TSH, 3rd generation with Free T4 reflex; Future; Expected date: 07/10/2023  -     T4; Future; Expected date: 07/10/2023  -     UA (URINE) with reflex to Scope; Future; Expected date: 07/10/2023    6  Acquired hypothyroidism  Assessment & Plan:  Has been out of medication for a week we will restart blood work ordered    Orders:  -     CBC;  Future; Expected date: 07/10/2023  - Comprehensive metabolic panel; Future; Expected date: 07/10/2023  -     Lipid Panel with Direct LDL reflex; Future; Expected date: 07/10/2023  -     TSH, 3rd generation with Free T4 reflex; Future; Expected date: 07/10/2023  -     T4; Future; Expected date: 07/10/2023  -     UA (URINE) with reflex to Scope; Future; Expected date: 07/10/2023    7  Pseudoseizures  Assessment & Plan:  None currently patient was seen by neurology in the past    Orders:  -     CBC; Future; Expected date: 07/10/2023  -     Comprehensive metabolic panel; Future; Expected date: 07/10/2023  -     Lipid Panel with Direct LDL reflex; Future; Expected date: 07/10/2023  -     TSH, 3rd generation with Free T4 reflex; Future; Expected date: 07/10/2023  -     T4; Future; Expected date: 07/10/2023  -     UA (URINE) with reflex to Scope; Future; Expected date: 07/10/2023    8  Palpitations  -     CBC; Future; Expected date: 07/10/2023  -     Comprehensive metabolic panel; Future; Expected date: 07/10/2023  -     Lipid Panel with Direct LDL reflex; Future; Expected date: 07/10/2023  -     TSH, 3rd generation with Free T4 reflex; Future; Expected date: 07/10/2023  -     T4; Future; Expected date: 07/10/2023  -     UA (URINE) with reflex to Scope; Future; Expected date: 07/10/2023    9  Obesity (BMI 30-39  9)  Assessment & Plan:  BMI 31 46 patient lost 20 pounds continue diet exercise weight loss    Orders:  -     CBC; Future; Expected date: 07/10/2023  -     Comprehensive metabolic panel; Future; Expected date: 07/10/2023  -     Lipid Panel with Direct LDL reflex; Future; Expected date: 07/10/2023  -     TSH, 3rd generation with Free T4 reflex; Future; Expected date: 07/10/2023  -     T4; Future; Expected date: 07/10/2023  -     UA (URINE) with reflex to Scope; Future; Expected date: 07/10/2023    10   Allergic rhinitis, unspecified seasonality, unspecified trigger  Assessment & Plan:  Stable patient has discontinued Astelin    Orders:  -     CBC; Future; Expected date: 07/10/2023  -     Comprehensive metabolic panel; Future; Expected date: 07/10/2023  -     Lipid Panel with Direct LDL reflex; Future; Expected date: 07/10/2023  -     TSH, 3rd generation with Free T4 reflex; Future; Expected date: 07/10/2023  -     T4; Future; Expected date: 07/10/2023  -     UA (URINE) with reflex to Scope; Future; Expected date: 07/10/2023    11  Dysfunction of Eustachian tube, unspecified laterality  Assessment & Plan:  As above    Orders:  -     CBC; Future; Expected date: 07/10/2023  -     Comprehensive metabolic panel; Future; Expected date: 07/10/2023  -     Lipid Panel with Direct LDL reflex; Future; Expected date: 07/10/2023  -     TSH, 3rd generation with Free T4 reflex; Future; Expected date: 07/10/2023  -     T4; Future; Expected date: 07/10/2023  -     UA (URINE) with reflex to Scope; Future; Expected date: 07/10/2023    12  Adult ADHD  -     buPROPion (WELLBUTRIN XL) 300 mg 24 hr tablet; Take 1 tablet (300 mg total) by mouth every morning  -     CBC; Future; Expected date: 07/10/2023  -     Comprehensive metabolic panel; Future; Expected date: 07/10/2023  -     Lipid Panel with Direct LDL reflex; Future; Expected date: 07/10/2023  -     TSH, 3rd generation with Free T4 reflex; Future; Expected date: 07/10/2023  -     T4; Future; Expected date: 07/10/2023  -     UA (URINE) with reflex to Scope; Future; Expected date: 07/10/2023    13  Hypothyroidism, unspecified type  -     levothyroxine 25 mcg tablet; Take 1 tablet (25 mcg total) by mouth daily in the early morning  -     CBC; Future; Expected date: 07/10/2023  -     Comprehensive metabolic panel; Future; Expected date: 07/10/2023  -     Lipid Panel with Direct LDL reflex; Future; Expected date: 07/10/2023  -     TSH, 3rd generation with Free T4 reflex; Future; Expected date: 07/10/2023  -     T4; Future; Expected date: 07/10/2023  -     UA (URINE) with reflex to Scope;  Future; Expected date: 07/10/2023 "    Subjective      PHQ-2/9 Depression Screening    Little interest or pleasure in doing things: 1 - several days  Feeling down, depressed, or hopeless: 1 - several days  Trouble falling or staying asleep, or sleeping too much: 0 - not at all  Feeling tired or having little energy: 1 - several days  Poor appetite or overeatin - not at all  Feeling bad about yourself - or that you are a failure or have let yourself or your family down: 0 - not at all  Trouble concentrating on things, such as reading the newspaper or watching television: 0 - not at all  Moving or speaking so slowly that other people could have noticed  Or the opposite - being so fidgety or restless that you have been moving around a lot more than usual: 1 - several days  Thoughts that you would be better off dead, or of hurting yourself in some way: 0 - not at all  PHQ-9 Score: 4   PHQ-9 Interpretation: No or Minimal depression        Patient states he ran out of his Wellbutrin and levothyroxine approximately 1 to 2 weeks ago  Yesterday patient palpitations and \"anxiety attack\"  Seen at [facility] work-up was benign  Review of Systems   Constitutional: Negative  HENT: Negative  Eyes: Negative  Respiratory: Negative  Cardiovascular:        HPI   Gastrointestinal: Negative  Endocrine: Negative  Genitourinary: Negative  Musculoskeletal: Negative  Skin: Negative  Allergic/Immunologic: Negative  Neurological: Negative  Hematological: Negative      Psychiatric/Behavioral:        HPI       Current Outpatient Medications on File Prior to Visit   Medication Sig   • [DISCONTINUED] buPROPion (WELLBUTRIN XL) 300 mg 24 hr tablet Take 1 tablet (300 mg total) by mouth every morning   • [DISCONTINUED] levothyroxine 25 mcg tablet Take 1 tablet (25 mcg total) by mouth daily in the early morning   • [DISCONTINUED] azelastine (ASTELIN) 0 1 % nasal spray 2 sprays into each nostril 2 (two) times a day Use in each nostril " as directed (Patient not taking: Reported on 2/14/2023)   • [DISCONTINUED] dicyclomine (BENTYL) 10 mg capsule Take 1 capsule (10 mg total) by mouth 4 (four) times a day as needed (Abdominal pain )   • [DISCONTINUED] ondansetron (Zofran ODT) 4 mg disintegrating tablet Take 1 tablet (4 mg total) by mouth every 6 (six) hours as needed for nausea or vomiting       Objective     /84   Pulse 84   Ht 6' (1 829 m)   Wt 105 kg (232 lb)   BMI 31 46 kg/m²     Physical Exam  Vitals and nursing note reviewed  Constitutional:       Appearance: Normal appearance  HENT:      Head: Normocephalic and atraumatic  Mouth/Throat:      Mouth: Mucous membranes are moist    Eyes:      General: No scleral icterus  Right eye: No discharge  Left eye: No discharge  Conjunctiva/sclera: Conjunctivae normal    Neck:      Vascular: No carotid bruit  Cardiovascular:      Rate and Rhythm: Normal rate and regular rhythm  Heart sounds: Normal heart sounds  Pulmonary:      Effort: Pulmonary effort is normal       Breath sounds: Normal breath sounds  Abdominal:      General: Bowel sounds are normal       Palpations: Abdomen is soft  Tenderness: There is no abdominal tenderness  Musculoskeletal:      Cervical back: Neck supple  Right lower leg: No edema  Left lower leg: No edema  Skin:     General: Skin is warm and dry  Neurological:      General: No focal deficit present  Mental Status: He is alert     Psychiatric:         Mood and Affect: Mood normal        Jamar Schmid DO

## 2023-06-26 ENCOUNTER — OFFICE VISIT (OUTPATIENT)
Dept: FAMILY MEDICINE CLINIC | Facility: CLINIC | Age: 28
End: 2023-06-26

## 2023-06-26 VITALS
HEIGHT: 72 IN | DIASTOLIC BLOOD PRESSURE: 80 MMHG | OXYGEN SATURATION: 98 % | BODY MASS INDEX: 31.61 KG/M2 | WEIGHT: 233.4 LBS | SYSTOLIC BLOOD PRESSURE: 106 MMHG | TEMPERATURE: 100.5 F | HEART RATE: 111 BPM

## 2023-06-26 DIAGNOSIS — J02.9 PHARYNGITIS, UNSPECIFIED ETIOLOGY: ICD-10-CM

## 2023-06-26 DIAGNOSIS — Z20.822 SUSPECTED COVID-19 VIRUS INFECTION: ICD-10-CM

## 2023-06-26 DIAGNOSIS — J03.90 TONSILLITIS: Primary | ICD-10-CM

## 2023-06-26 DIAGNOSIS — E03.9 ACQUIRED HYPOTHYROIDISM: ICD-10-CM

## 2023-06-26 LAB
S PYO AG THROAT QL: NEGATIVE
SARS-COV-2 AG UPPER RESP QL IA: NEGATIVE
VALID CONTROL: NORMAL

## 2023-06-26 PROCEDURE — 87880 STREP A ASSAY W/OPTIC: CPT | Performed by: NURSE PRACTITIONER

## 2023-06-26 PROCEDURE — 99214 OFFICE O/P EST MOD 30 MIN: CPT | Performed by: NURSE PRACTITIONER

## 2023-06-26 PROCEDURE — 87811 SARS-COV-2 COVID19 W/OPTIC: CPT | Performed by: NURSE PRACTITIONER

## 2023-06-26 RX ORDER — SULFAMETHOXAZOLE AND TRIMETHOPRIM 800; 160 MG/1; MG/1
1 TABLET ORAL EVERY 12 HOURS SCHEDULED
Qty: 20 TABLET | Refills: 0 | Status: SHIPPED | OUTPATIENT
Start: 2023-06-26 | End: 2023-07-06

## 2023-06-26 RX ORDER — PREDNISONE 10 MG/1
TABLET ORAL
Qty: 30 TABLET | Refills: 0 | Status: SHIPPED | OUTPATIENT
Start: 2023-06-26

## 2023-06-26 NOTE — ASSESSMENT & PLAN NOTE
10-day course of Bactrim and prednisone taper were ordered to treat acute tonsillitis  Patient was advised that Tylenol and ibuprofen can be used as needed for fevers  He was also advised to use salt water gargles and Cepacol as needed to help with throat pain  He was advised that if he begins to feel as though he is having any difficulty breathing or having any tightening of his airway he must be evaluated in the ED

## 2023-06-26 NOTE — PROGRESS NOTES
Name: Shelli Connelly      : 1995      MRN: [id number]  Encounter Provider: KEMAR Josue  Encounter Date: 2023   Encounter department: [place] [address]  Tonsillitis  Assessment & Plan:  10-day course of Bactrim and prednisone taper were ordered to treat acute tonsillitis  Patient was advised that Tylenol and ibuprofen can be used as needed for fevers  He was also advised to use salt water gargles and Cepacol as needed to help with throat pain  He was advised that if he begins to feel as though he is having any difficulty breathing or having any tightening of his airway he must be evaluated in the ED  Orders:  -     sulfamethoxazole-trimethoprim (BACTRIM DS) 800-160 mg per tablet; Take 1 tablet by mouth every 12 (twelve) hours for 10 days  -     predniSONE 10 mg tablet; Use 5 tablets for 2 days  Use 4 tablets for 2 days  Use 3 tablets for 2 days  Use 2 tablets for 2 days  Use 1 tablet for 2 days  2  Suspected COVID-19 virus infection  -     POCT Rapid Covid Ag    3  Pharyngitis, unspecified etiology  -     POCT rapid strepA    4  Acquired hypothyroidism  Assessment & Plan:  Patient does have repeat TSH and T4 levels ordered to be completed prior to next office visit  Subjective      Pharyngitis: Patient reports over the past 24 hours he has been experiencing symptoms of sore throat, increased fatigue, fever, chills, productive cough, and dysphagia  Patient denies any shortness of breath  Rapid COVID and rapid strep A tests were both negative in the office today  Hypothyroidism: Patient's most recent TSH level was noted to be elevated  Patient is currently managed on levothyroxine 25 mcg daily  Review of Systems   Constitutional: Positive for chills, fatigue and fever  HENT: Positive for sore throat and trouble swallowing (pain with swallowing)   Negative for congestion, ear pain, postnasal drip, rhinorrhea, sinus pressure, sinus pain and voice change  Eyes: Negative for pain and visual disturbance  Respiratory: Positive for cough (productive-intermittent)  Negative for chest tightness, shortness of breath and wheezing  Cardiovascular: Negative for chest pain, palpitations and leg swelling  Gastrointestinal: Negative for abdominal pain, constipation, diarrhea, nausea and vomiting  Endocrine: Negative for cold intolerance and heat intolerance  Genitourinary: Negative for decreased urine volume, dysuria and hematuria  Musculoskeletal: Negative for arthralgias, back pain and myalgias  Skin: Negative for color change and rash  Allergic/Immunologic: Negative for environmental allergies  Neurological: Negative for dizziness, seizures, syncope, weakness, light-headedness, numbness and headaches  Hematological: Negative for adenopathy  Psychiatric/Behavioral: Negative for confusion  The patient is not nervous/anxious  All other systems reviewed and are negative  Current Outpatient Medications on File Prior to Visit   Medication Sig   • buPROPion (WELLBUTRIN XL) 300 mg 24 hr tablet Take 1 tablet (300 mg total) by mouth every morning   • levothyroxine 25 mcg tablet Take 1 tablet (25 mcg total) by mouth daily in the early morning       Objective     /80 (BP Location: Right arm, Patient Position: Sitting, Cuff Size: Standard)   Pulse (!) 111   Temp 100 5 °F (38 1 °C) (Tympanic)   Ht 6' (1 829 m)   Wt 106 kg (233 lb 6 4 oz)   SpO2 98%   BMI 31 65 kg/m²     Physical Exam  Vitals and nursing note reviewed  Constitutional:       General: He is not in acute distress  Appearance: Normal appearance  He is not ill-appearing  HENT:      Head: Normocephalic  Right Ear: Hearing normal  A middle ear effusion (small) is present  Left Ear: Hearing normal  There is impacted cerumen  Mouth/Throat:      Pharynx: Posterior oropharyngeal erythema present   No pharyngeal swelling, oropharyngeal exudate or uvula swelling  Tonsils: Tonsillar exudate present  No tonsillar abscesses  3+ on the right  1+ on the left  Comments: Posterior oropharynx was noted to be erythematous  Patient's right tonsil was also noted to be erythematous and enlarged compared to left tonsil  A small amount of white exudate was also noted on right tonsil  Eyes:      Conjunctiva/sclera: Conjunctivae normal    Cardiovascular:      Rate and Rhythm: Normal rate and regular rhythm  Pulses: Normal pulses  Carotid pulses are 2+ on the right side and 2+ on the left side  Radial pulses are 2+ on the right side and 2+ on the left side  Posterior tibial pulses are 2+ on the right side and 2+ on the left side  Heart sounds: Normal heart sounds  No murmur heard  Pulmonary:      Effort: Pulmonary effort is normal  No respiratory distress  Breath sounds: Normal breath sounds  No decreased breath sounds, wheezing, rhonchi or rales  Abdominal:      General: Abdomen is flat  Bowel sounds are normal  There is no distension  Palpations: Abdomen is soft  Tenderness: There is no abdominal tenderness  There is no guarding  Musculoskeletal:         General: Normal range of motion  Cervical back: Normal range of motion  Right lower leg: No edema  Left lower leg: No edema  Skin:     General: Skin is warm and dry  Capillary Refill: Capillary refill takes less than 2 seconds  Neurological:      General: No focal deficit present  Mental Status: He is alert and oriented to person, place, and time  Psychiatric:         Mood and Affect: Mood normal          Behavior: Behavior normal          Thought Content:  Thought content normal          Judgment: Judgment normal        KEMAR Vazquez

## 2023-07-19 ENCOUNTER — OFFICE VISIT (OUTPATIENT)
Dept: FAMILY MEDICINE CLINIC | Facility: CLINIC | Age: 28
End: 2023-07-19

## 2023-07-19 VITALS
SYSTOLIC BLOOD PRESSURE: 110 MMHG | HEART RATE: 98 BPM | DIASTOLIC BLOOD PRESSURE: 74 MMHG | BODY MASS INDEX: 31.15 KG/M2 | WEIGHT: 230 LBS | HEIGHT: 72 IN

## 2023-07-19 DIAGNOSIS — E78.1 HYPERTRIGLYCERIDEMIA: ICD-10-CM

## 2023-07-19 DIAGNOSIS — E66.9 OBESITY (BMI 30-39.9): ICD-10-CM

## 2023-07-19 DIAGNOSIS — F44.5 PSYCHOGENIC NONEPILEPTIC SEIZURE: ICD-10-CM

## 2023-07-19 DIAGNOSIS — F32.9 MAJOR DEPRESSIVE DISORDER, SINGLE EPISODE WITH ANXIOUS DISTRESS: ICD-10-CM

## 2023-07-19 DIAGNOSIS — F41.1 GENERALIZED ANXIETY DISORDER: ICD-10-CM

## 2023-07-19 DIAGNOSIS — F90.9 ADULT ADHD: ICD-10-CM

## 2023-07-19 DIAGNOSIS — E80.4 GILBERT'S SYNDROME: ICD-10-CM

## 2023-07-19 DIAGNOSIS — F41.0 PANIC DISORDER WITHOUT AGORAPHOBIA WITH SEVERE PANIC ATTACKS: ICD-10-CM

## 2023-07-19 DIAGNOSIS — E03.9 ACQUIRED HYPOTHYROIDISM: Primary | ICD-10-CM

## 2023-07-19 PROBLEM — J03.90 TONSILLITIS: Status: RESOLVED | Noted: 2023-06-26 | Resolved: 2023-07-19

## 2023-07-19 PROBLEM — R76.8 HSV-2 SEROPOSITIVE: Status: RESOLVED | Noted: 2020-01-21 | Resolved: 2023-07-19

## 2023-07-19 PROCEDURE — 99214 OFFICE O/P EST MOD 30 MIN: CPT | Performed by: FAMILY MEDICINE

## 2023-07-19 NOTE — PROGRESS NOTES
Name: Mo Pinedo      : 1995      MRN: [id number]  Encounter Provider: Elly Jovel DO  Encounter Date: 2023   Encounter department: [address] [place] [address] PRIMARY CARE    Assessment & Plan     Hypothyroidism, currently on levothyroxine patient to complete blood work in the near future  2. Pseudoseizures, asymptomatic was seen by neurology  With 3. Gilbert syndrome, complete blood work  4. Panic disorder/ADHD/MDD/MICHELLE, all stable on Wellbutrin  mg daily  5. BMI 31.19 diet exercise weight loss recommended #6. Hypertriglyceridemia, patient will complete blood work  7. Patient to return in 6 months sooner if needed        1. Acquired hypothyroidism  Assessment & Plan:  Patient will complete blood work      2. Pseudoseizures  Assessment & Plan:  Asymptomatic at present was seen by neurology      3. Gilbert's syndrome  Assessment & Plan:  Patient will complete blood work      4. Panic disorder without agoraphobia with severe panic attacks  Assessment & Plan:  As above      5. Adult ADHD  Assessment & Plan:  As above      6. Generalized anxiety disorder  Assessment & Plan:  As above      7. Obesity (BMI 30-39. 9)  Assessment & Plan:  BMI 31.19 diet exercise weight loss recommended      8. Hypertriglyceridemia  Assessment & Plan: We will complete blood work      9. Major depressive disorder, single episode with anxious distress  Assessment & Plan:  In remission on bupropion  mg daily             Subjective      Patient feels back to normal bupropion no anxiety attacks or palpitations. Unfortunately patient did not complete blood work and will complete in the next 2 weeks. Review of Systems   Constitutional: Negative. HENT: Negative. Eyes: Negative. Respiratory: Negative. Cardiovascular: Negative. Gastrointestinal: Negative. Endocrine: Negative. Genitourinary: Negative. Musculoskeletal: Negative. Skin: Negative. Allergic/Immunologic: Negative. Neurological: Negative. Hematological: Negative. Psychiatric/Behavioral:        HPI       Current Outpatient Medications on File Prior to Visit   Medication Sig   • buPROPion (WELLBUTRIN XL) 300 mg 24 hr tablet Take 1 tablet (300 mg total) by mouth every morning   • levothyroxine 25 mcg tablet Take 1 tablet (25 mcg total) by mouth daily in the early morning   • [DISCONTINUED] predniSONE 10 mg tablet Use 5 tablets for 2 days. Use 4 tablets for 2 days. Use 3 tablets for 2 days. Use 2 tablets for 2 days. Use 1 tablet for 2 days. Objective     /74   Pulse 98   Ht 6' (1.829 m)   Wt 104 kg (230 lb)   BMI 31.19 kg/m²     Physical Exam  Vitals and nursing note reviewed. Constitutional:       Appearance: Normal appearance. HENT:      Head: Normocephalic and atraumatic. Mouth/Throat:      Mouth: Mucous membranes are moist.   Eyes:      General: No scleral icterus. Neck:      Vascular: No carotid bruit. Cardiovascular:      Rate and Rhythm: Normal rate and regular rhythm. Heart sounds: Normal heart sounds. Pulmonary:      Effort: Pulmonary effort is normal.      Breath sounds: Normal breath sounds. Abdominal:      General: Bowel sounds are normal.      Palpations: Abdomen is soft. Tenderness: There is no abdominal tenderness. Musculoskeletal:      Cervical back: Neck supple. Right lower leg: No edema. Left lower leg: No edema. Skin:     General: Skin is warm and dry. Neurological:      General: No focal deficit present. Mental Status: He is alert.    Psychiatric:         Mood and Affect: Mood normal.       Jamar Schmid DO

## 2023-07-19 NOTE — PATIENT INSTRUCTIONS
Complete blood work as ordered  Continue present therapy  Diet exercise weight loss recommended  Return in 6 months for office visit sooner if needed

## 2023-08-29 ENCOUNTER — OFFICE VISIT (OUTPATIENT)
Dept: FAMILY MEDICINE CLINIC | Facility: CLINIC | Age: 28
End: 2023-08-29

## 2023-08-29 VITALS
WEIGHT: 236 LBS | SYSTOLIC BLOOD PRESSURE: 140 MMHG | BODY MASS INDEX: 31.97 KG/M2 | DIASTOLIC BLOOD PRESSURE: 100 MMHG | HEIGHT: 72 IN | OXYGEN SATURATION: 98 % | RESPIRATION RATE: 20 BRPM | HEART RATE: 77 BPM | TEMPERATURE: 99.9 F

## 2023-08-29 DIAGNOSIS — J02.9 VIRAL PHARYNGITIS: Primary | ICD-10-CM

## 2023-08-29 DIAGNOSIS — E03.9 ACQUIRED HYPOTHYROIDISM: ICD-10-CM

## 2023-08-29 LAB — S PYO AG THROAT QL: NEGATIVE

## 2023-08-29 PROCEDURE — 87880 STREP A ASSAY W/OPTIC: CPT | Performed by: NURSE PRACTITIONER

## 2023-08-29 PROCEDURE — 99214 OFFICE O/P EST MOD 30 MIN: CPT | Performed by: NURSE PRACTITIONER

## 2023-08-29 RX ORDER — METHYLPREDNISOLONE 4 MG/1
TABLET ORAL
Qty: 21 EACH | Refills: 0 | Status: SHIPPED | OUTPATIENT
Start: 2023-08-29

## 2023-08-29 NOTE — ASSESSMENT & PLAN NOTE
Well-controlled on current regimen. Patient does have a repeat TSH level ordered to be completed prior to next office visit.

## 2023-08-29 NOTE — PROGRESS NOTES
Name: Malvin Collier      : 1995      MRN: [id number]  Encounter Provider: KEMAR Funes  Encounter Date: 2023   Encounter department: [place] [address] [address] [facility]     1. Viral pharyngitis  Assessment & Plan:  Patient's symptoms are consistent with viral pharyngitis. Medrol Dosepak was ordered to help with dysphagia and throat inflammation. Patient was advised on supportive care including Tylenol as needed for pain and salt water gargles and Cepacol to help with throat discomfort. Orders:  -     POCT rapid strepA  -     methylPREDNISolone 4 MG tablet therapy pack; Use as directed on package    2. Acquired hypothyroidism  Assessment & Plan:  Well-controlled on current regimen. Patient does have a repeat TSH level ordered to be completed prior to next office visit. Subjective      Pharyngitis: Patient reports that over the past 24 hours he has been experiencing symptoms of a sore throat, dysphagia, and non-productive cough. Patient denies fever, chills, shortness of breath, or other URI symptoms. Rapid strep A test completed in the office today was negative. Hypothyroidism: Patient's most recent TSH level was normal.  Patient is currently managed on levothyroxine 25 mcg daily. Review of Systems   Constitutional: Negative for chills and fever. HENT: Positive for sore throat and trouble swallowing (pain with swallowing). Negative for congestion, ear pain, postnasal drip, rhinorrhea, sinus pressure, sinus pain and voice change. Eyes: Negative for pain and visual disturbance. Respiratory: Positive for cough (non-productive ). Negative for chest tightness, shortness of breath and wheezing. Cardiovascular: Negative for chest pain, palpitations and leg swelling. Gastrointestinal: Negative for abdominal pain, constipation, diarrhea, nausea and vomiting. Endocrine: Negative for cold intolerance and heat intolerance.    Genitourinary: Negative for decreased urine volume, dysuria and hematuria. Musculoskeletal: Negative for arthralgias, back pain and myalgias. Skin: Negative for color change and rash. Allergic/Immunologic: Negative for environmental allergies. Neurological: Negative for dizziness, seizures, syncope, weakness, light-headedness, numbness and headaches. Hematological: Negative for adenopathy. Psychiatric/Behavioral: Negative for confusion. The patient is not nervous/anxious. All other systems reviewed and are negative. Current Outpatient Medications on File Prior to Visit   Medication Sig   • buPROPion (WELLBUTRIN XL) 300 mg 24 hr tablet Take 1 tablet (300 mg total) by mouth every morning   • levothyroxine 25 mcg tablet Take 1 tablet (25 mcg total) by mouth daily in the early morning       Objective     /100 (BP Location: Left arm, Patient Position: Sitting, Cuff Size: Large)   Pulse 77   Temp 99.9 °F (37.7 °C) (Tympanic)   Resp 20   Ht 6' (1.829 m)   Wt 107 kg (236 lb)   SpO2 98%   BMI 32.01 kg/m²     Physical Exam  Vitals and nursing note reviewed. Constitutional:       General: He is not in acute distress. Appearance: Normal appearance. He is not ill-appearing. HENT:      Head: Normocephalic. Right Ear: Hearing normal. A middle ear effusion (small) is present. Left Ear: Hearing normal. A middle ear effusion (small) is present. Mouth/Throat:      Pharynx: No pharyngeal swelling, oropharyngeal exudate, posterior oropharyngeal erythema or uvula swelling. Tonsils: No tonsillar exudate or tonsillar abscesses. Comments: Some increased redness was noted on the patient's uvula but no other abnormalities were noted in the patient's throat. Eyes:      Conjunctiva/sclera: Conjunctivae normal.   Cardiovascular:      Rate and Rhythm: Normal rate and regular rhythm. Pulses: Normal pulses. Carotid pulses are 2+ on the right side and 2+ on the left side.        Radial pulses are 2+ on the right side and 2+ on the left side. Posterior tibial pulses are 2+ on the right side and 2+ on the left side. Heart sounds: Normal heart sounds. No murmur heard. Pulmonary:      Effort: Pulmonary effort is normal. No respiratory distress. Breath sounds: Normal breath sounds. No decreased breath sounds, wheezing, rhonchi or rales. Abdominal:      General: Abdomen is flat. Bowel sounds are normal. There is no distension. Palpations: Abdomen is soft. Tenderness: There is no abdominal tenderness. There is no guarding. Musculoskeletal:         General: Normal range of motion. Cervical back: Normal range of motion. Right lower leg: No edema. Left lower leg: No edema. Skin:     General: Skin is warm and dry. Capillary Refill: Capillary refill takes less than 2 seconds. Neurological:      General: No focal deficit present. Mental Status: He is alert and oriented to person, place, and time. Psychiatric:         Mood and Affect: Mood normal.         Behavior: Behavior normal.         Thought Content:  Thought content normal.         Judgment: Judgment normal.       KEMAR Quan

## 2023-08-29 NOTE — ASSESSMENT & PLAN NOTE
Patient's symptoms are consistent with viral pharyngitis. Medrol Dosepak was ordered to help with dysphagia and throat inflammation. Patient was advised on supportive care including Tylenol as needed for pain and salt water gargles and Cepacol to help with throat discomfort.

## 2023-09-06 DIAGNOSIS — F90.9 ADULT ADHD: ICD-10-CM

## 2023-09-06 DIAGNOSIS — F41.1 GENERALIZED ANXIETY DISORDER: ICD-10-CM

## 2023-09-06 DIAGNOSIS — F41.0 PANIC DISORDER WITHOUT AGORAPHOBIA WITH SEVERE PANIC ATTACKS: ICD-10-CM

## 2023-09-06 DIAGNOSIS — E03.9 HYPOTHYROIDISM, UNSPECIFIED TYPE: ICD-10-CM

## 2023-09-06 DIAGNOSIS — F32.9 MAJOR DEPRESSIVE DISORDER, SINGLE EPISODE WITH ANXIOUS DISTRESS: ICD-10-CM

## 2023-09-06 RX ORDER — BUPROPION HYDROCHLORIDE 300 MG/1
300 TABLET ORAL EVERY MORNING
Qty: 90 TABLET | Refills: 2 | Status: SHIPPED | OUTPATIENT
Start: 2023-09-06

## 2023-09-06 RX ORDER — LEVOTHYROXINE SODIUM 0.03 MG/1
25 TABLET ORAL
Qty: 90 TABLET | Refills: 2 | Status: SHIPPED | OUTPATIENT
Start: 2023-09-06

## 2023-10-28 PROBLEM — J02.9 VIRAL PHARYNGITIS: Status: RESOLVED | Noted: 2023-08-29 | Resolved: 2023-10-28

## 2023-12-13 ENCOUNTER — OFFICE VISIT (OUTPATIENT)
Dept: FAMILY MEDICINE CLINIC | Facility: CLINIC | Age: 28
End: 2023-12-13

## 2023-12-13 VITALS
BODY MASS INDEX: 31.42 KG/M2 | WEIGHT: 232 LBS | HEIGHT: 72 IN | DIASTOLIC BLOOD PRESSURE: 70 MMHG | OXYGEN SATURATION: 97 % | TEMPERATURE: 97.9 F | SYSTOLIC BLOOD PRESSURE: 110 MMHG | HEART RATE: 108 BPM

## 2023-12-13 DIAGNOSIS — R11.2 NAUSEA AND VOMITING, UNSPECIFIED VOMITING TYPE: Primary | ICD-10-CM

## 2023-12-13 DIAGNOSIS — F41.1 GENERALIZED ANXIETY DISORDER: ICD-10-CM

## 2023-12-13 DIAGNOSIS — A08.4 VIRAL GASTROENTERITIS: ICD-10-CM

## 2023-12-13 DIAGNOSIS — F41.0 PANIC DISORDER WITHOUT AGORAPHOBIA WITH SEVERE PANIC ATTACKS: ICD-10-CM

## 2023-12-13 DIAGNOSIS — R19.7 DIARRHEA, UNSPECIFIED TYPE: ICD-10-CM

## 2023-12-13 DIAGNOSIS — R50.9 FEVER, UNSPECIFIED FEVER CAUSE: ICD-10-CM

## 2023-12-13 DIAGNOSIS — F32.9 MAJOR DEPRESSIVE DISORDER, SINGLE EPISODE WITH ANXIOUS DISTRESS: ICD-10-CM

## 2023-12-13 LAB
SARS-COV-2 AG UPPER RESP QL IA: NEGATIVE
VALID CONTROL: NORMAL

## 2023-12-13 PROCEDURE — 87811 SARS-COV-2 COVID19 W/OPTIC: CPT | Performed by: FAMILY MEDICINE

## 2023-12-13 PROCEDURE — 99214 OFFICE O/P EST MOD 30 MIN: CPT | Performed by: FAMILY MEDICINE

## 2023-12-13 RX ORDER — ONDANSETRON 8 MG/1
8 TABLET, ORALLY DISINTEGRATING ORAL EVERY 8 HOURS PRN
Qty: 10 TABLET | Refills: 0 | Status: SHIPPED | OUTPATIENT
Start: 2023-12-13 | End: 2023-12-16

## 2023-12-13 NOTE — PROGRESS NOTES
Name: Curtis Casas      : 1995      MRN: [id number]  Encounter Provider: Carlos Kelley DO  Encounter Date: 2023   Encounter department: [place] [address] [address]     1. Nausea vomiting #2. Fever #3. Diarrhea #4. Viral gastroenteritis  Rapid COVID-negative  Zofran as ordered  Clear liquid diet for 24 hours then advance as tolerated  May use Imodium as needed for diarrhea  When tolerating oral food may use brat diet  May use Tylenol as needed for fever  5. MDD/MICHELLE/panic disorder, stable on Wellbutrin  6. Return in 1 week if still with symptoms if no improvement the next few days or gets worse please call office or report to ER      1. Nausea and vomiting, unspecified vomiting type  -     POCT Rapid Covid Ag  -     ondansetron (ZOFRAN-ODT) 8 mg disintegrating tablet; Take 1 tablet (8 mg total) by mouth every 8 (eight) hours as needed for nausea or vomiting for up to 3 days    2. Diarrhea, unspecified type  -     POCT Rapid Covid Ag  -     ondansetron (ZOFRAN-ODT) 8 mg disintegrating tablet; Take 1 tablet (8 mg total) by mouth every 8 (eight) hours as needed for nausea or vomiting for up to 3 days    3. Fever, unspecified fever cause  -     POCT Rapid Covid Ag  -     ondansetron (ZOFRAN-ODT) 8 mg disintegrating tablet; Take 1 tablet (8 mg total) by mouth every 8 (eight) hours as needed for nausea or vomiting for up to 3 days    4. Viral gastroenteritis  -     ondansetron (ZOFRAN-ODT) 8 mg disintegrating tablet; Take 1 tablet (8 mg total) by mouth every 8 (eight) hours as needed for nausea or vomiting for up to 3 days    5. Major depressive disorder, single episode with anxious distress  Assessment & Plan:  Stable on Wellbutrin      6. Generalized anxiety disorder  Assessment & Plan:  Stable on Wellbutrin      7.  Panic disorder without agoraphobia with severe panic attacks  Assessment & Plan:  Stable on Wellbutrin             Subjective      Patient's son was sick since Sunday with the same symptoms. Patient started last evening with fever diarrhea nausea vomiting. No abdominal pain no medication has been taken      Review of Systems   Constitutional:  Positive for fever. HENT: Negative. Eyes: Negative. Respiratory: Negative. Cardiovascular: Negative. Gastrointestinal:         HPI   Endocrine: Negative. Genitourinary: Negative. Musculoskeletal:  Negative for myalgias. Allergic/Immunologic: Negative. Neurological:  Negative for dizziness and headaches. Hematological: Negative. Psychiatric/Behavioral: Negative. Current Outpatient Medications on File Prior to Visit   Medication Sig   • buPROPion (WELLBUTRIN XL) 300 mg 24 hr tablet TAKE 1 TABLET (300 MG TOTAL) BY MOUTH EVERY MORNING. • levothyroxine 25 mcg tablet TAKE 1 TABLET (25 MCG TOTAL) BY MOUTH DAILY IN THE EARLY MORNING   • [DISCONTINUED] methylPREDNISolone 4 MG tablet therapy pack Use as directed on package (Patient not taking: Reported on 12/13/2023)       Objective     /70 (BP Location: Right arm, Patient Position: Sitting, Cuff Size: Standard)   Pulse (!) 108   Temp 97.9 °F (36.6 °C) (Tympanic)   Ht 6' (1.829 m)   Wt 105 kg (232 lb)   SpO2 97%   BMI 31.46 kg/m²     Physical Exam  Vitals and nursing note reviewed. Constitutional:       General: He is not in acute distress. Appearance: Normal appearance. He is not ill-appearing, toxic-appearing or diaphoretic. HENT:      Head: Normocephalic and atraumatic. Right Ear: Tympanic membrane normal.      Left Ear: Tympanic membrane normal.      Nose: Nose normal.      Mouth/Throat:      Mouth: Mucous membranes are moist.      Pharynx: Oropharynx is clear. No oropharyngeal exudate or posterior oropharyngeal erythema. Eyes:      General: No scleral icterus. Right eye: No discharge. Conjunctiva/sclera: Conjunctivae normal.   Cardiovascular:      Rate and Rhythm: Normal rate and regular rhythm.       Heart sounds: Normal heart sounds. Pulmonary:      Effort: Pulmonary effort is normal.      Breath sounds: Normal breath sounds. Abdominal:      General: Bowel sounds are normal. There is no distension. Palpations: Abdomen is soft. There is no mass. Tenderness: There is no abdominal tenderness. There is no right CVA tenderness, left CVA tenderness, guarding or rebound. Hernia: No hernia is present. Musculoskeletal:      Cervical back: Neck supple. No tenderness. Right lower leg: No edema. Left lower leg: No edema. Lymphadenopathy:      Cervical: No cervical adenopathy. Skin:     General: Skin is warm and dry. Neurological:      General: No focal deficit present.    Psychiatric:         Mood and Affect: Mood normal.       Jamar Schmid DO

## 2023-12-13 NOTE — PATIENT INSTRUCTIONS
Clear liquid diet for 24 hours especially half-strength Gatorade  May use brat diet when tolerating food  May use Imodium as needed for diarrhea  Use Zofran every 8 hours as needed for nausea vomiting  If not improving in next 24 to 48 hours call office if worsen report to ER  Return in 1 week if still with symptoms

## 2024-01-22 ENCOUNTER — TELEPHONE (OUTPATIENT)
Dept: FAMILY MEDICINE CLINIC | Facility: CLINIC | Age: 29
End: 2024-01-22

## 2024-01-22 NOTE — TELEPHONE ENCOUNTER
The patient did not show up for scheduled follow-up today, 1/22/2024.  Please call patient have him complete blood work that was ordered and make a follow-up in the next few weeks

## 2024-01-23 NOTE — TELEPHONE ENCOUNTER
Tried to call patient but was not able to leave voicemail to reschedule appointment.Will try later.Thank You

## 2024-01-29 ENCOUNTER — OFFICE VISIT (OUTPATIENT)
Dept: FAMILY MEDICINE CLINIC | Facility: CLINIC | Age: 29
End: 2024-01-29

## 2024-01-29 VITALS
RESPIRATION RATE: 20 BRPM | SYSTOLIC BLOOD PRESSURE: 128 MMHG | DIASTOLIC BLOOD PRESSURE: 76 MMHG | HEART RATE: 68 BPM | WEIGHT: 231 LBS | HEIGHT: 72 IN | OXYGEN SATURATION: 98 % | BODY MASS INDEX: 31.29 KG/M2

## 2024-01-29 DIAGNOSIS — K12.2 UVULITIS: Primary | ICD-10-CM

## 2024-01-29 DIAGNOSIS — R19.7 DIARRHEA, UNSPECIFIED TYPE: ICD-10-CM

## 2024-01-29 PROCEDURE — 99213 OFFICE O/P EST LOW 20 MIN: CPT | Performed by: FAMILY MEDICINE

## 2024-01-29 RX ORDER — CLINDAMYCIN HYDROCHLORIDE 300 MG/1
300 CAPSULE ORAL EVERY 8 HOURS SCHEDULED
Qty: 30 CAPSULE | Refills: 0 | Status: SHIPPED | OUTPATIENT
Start: 2024-01-29 | End: 2024-02-08

## 2024-01-29 NOTE — PROGRESS NOTES
Name: Jhon Gentile      : 1995      MRN: [id number]  Encounter Provider: Noah Batista MD  Encounter Date: 2024   Encounter department: [facility] PRIMARY CARE    Assessment & Plan     1. Uvulitis  Comments:  Some irritation.  Clindamycin.  Follow in 2 weeks if needed.  Orders:  -     clindamycin (CLEOCIN) 300 MG capsule; Take 1 capsule (300 mg total) by mouth every 8 (eight) hours for 10 days    2. Diarrhea, unspecified type  Comments:  Some issues.  Imodium helped a bit.  Reviewed about clindamycin side effects.  Brat diet, continue fluids.         Subjective      Chief Complaint   Patient presents with   • Follow-up   • Diarrhea     Per pt @home covid test today negative.       Woke up with swelling of uvula.  Feels it with swallowing and coughing.    Had diarrhea as well. Immodium helped.    No face or tooth pain.  Minimal post nasal drip.            Review of Systems   Constitutional:  Negative for chills, diaphoresis, fatigue and fever.   HENT:  Positive for postnasal drip (minimal) and trouble swallowing (per HPI). Negative for congestion, sinus pressure and sinus pain.    Respiratory: Negative.  Negative for cough.    Cardiovascular: Negative.    Gastrointestinal:  Positive for diarrhea.       Active and Past History reviewed.  Allergies reviewed.    Current Outpatient Medications on File Prior to Visit   Medication Sig   • buPROPion (WELLBUTRIN XL) 300 mg 24 hr tablet TAKE 1 TABLET (300 MG TOTAL) BY MOUTH EVERY MORNING.   • levothyroxine 25 mcg tablet TAKE 1 TABLET (25 MCG TOTAL) BY MOUTH DAILY IN THE EARLY MORNING   • ondansetron (ZOFRAN-ODT) 8 mg disintegrating tablet Take 1 tablet (8 mg total) by mouth every 8 (eight) hours as needed for nausea or vomiting for up to 3 days       Objective     Data:      /76 (BP Location: Left arm, Patient Position: Sitting, Cuff Size: Large)   Pulse 68   Resp 20   Ht 6' (1.829 m)   Wt 105 kg (231 lb)   SpO2 98%   BMI 31.33 kg/m²      Physical Exam  Vitals and nursing note reviewed.   Constitutional:       Appearance: Normal appearance. He is well-developed.   HENT:      Head: Normocephalic and atraumatic.      Mouth/Throat:      Comments: Uvula slightly elongated, slightly reddened and irritated  Cardiovascular:      Rate and Rhythm: Normal rate and regular rhythm.      Pulses:           Carotid pulses are 2+ on the right side and 2+ on the left side.     Heart sounds: Normal heart sounds. No murmur heard.     No friction rub. No gallop.   Pulmonary:      Effort: Pulmonary effort is normal. No respiratory distress.      Breath sounds: Normal breath sounds. No wheezing or rales.   Musculoskeletal:      Cervical back: Normal range of motion and neck supple.   Neurological:      Mental Status: He is alert.       Noah Batista MD

## 2024-01-29 NOTE — PATIENT INSTRUCTIONS
1. Uvulitis  Comments:  Some irritation.  Clindamycin.  Follow in 2 weeks if needed.  Orders:  -     clindamycin (CLEOCIN) 300 MG capsule; Take 1 capsule (300 mg total) by mouth every 8 (eight) hours for 10 days    2. Diarrhea, unspecified type  Comments:  Some issues.  Imodium helped a bit.  Reviewed about clindamycin side effects.  Brat diet, continue fluids.        COVID 19 Instructions    Jhon Gentile was advised to limit contact with others to essential tasks such as getting food, medications, and medical care.    Proper handwashing reviewed, and Hand sanitzer when washing is not available.    If the patient develops symptoms of COVID 19, the patient should call the office as soon as possible.    It is strongly recommended that Flu Vaccinations be obtained.      Virtual Visits:  [place]: This works on smart phones (any phone with Internet browsing capability).  You should get a text message when the provider is ready to see you.  Click on the link in the text message, and the call should start.  You will need to type in your name, and allow camera and microphone access.  This is HIPPA compliant, and secure.      If you have not already done so, get immunized to COVID 19.      We are committed to getting you vaccinated as soon as possible and will be closely following [facility] and [facility] guidelines as they are released and revised.  Please refer to our COVID-19 vaccine webpage for the most up to date information on the vaccine and our distribution efforts.    This site will also have the most up to date recommendations for COVID booster vaccine.    [email or web address]    Call 2-475-[phone] ([phone]), option 7    You can also visit https://www.vaccines.gov/ to find vaccines in your area.    OUR LOCATION:    [facility]  Formerly [facility], [id number], [address]  [place], [place], [address] [phone]  Fax: [phone]    Lab services, Rheumatology, and OB/GYN are  at this location as well.

## 2024-02-21 PROBLEM — Z00.00 HEALTH CARE MAINTENANCE: Status: RESOLVED | Noted: 2018-10-24 | Resolved: 2024-02-21

## 2024-05-09 ENCOUNTER — OFFICE VISIT (OUTPATIENT)
Dept: FAMILY MEDICINE CLINIC | Facility: CLINIC | Age: 29
End: 2024-05-09

## 2024-05-09 VITALS
BODY MASS INDEX: 29.39 KG/M2 | SYSTOLIC BLOOD PRESSURE: 106 MMHG | HEIGHT: 72 IN | OXYGEN SATURATION: 98 % | HEART RATE: 96 BPM | DIASTOLIC BLOOD PRESSURE: 78 MMHG | WEIGHT: 217 LBS

## 2024-05-09 DIAGNOSIS — R19.7 DIARRHEA, UNSPECIFIED TYPE: ICD-10-CM

## 2024-05-09 DIAGNOSIS — B37.0 THRUSH: Primary | ICD-10-CM

## 2024-05-09 DIAGNOSIS — J06.9 VIRAL UPPER RESPIRATORY TRACT INFECTION: ICD-10-CM

## 2024-05-09 PROCEDURE — 99214 OFFICE O/P EST MOD 30 MIN: CPT | Performed by: FAMILY MEDICINE

## 2024-05-09 RX ORDER — CLOTRIMAZOLE 10 MG/1
10 LOZENGE ORAL; TOPICAL
Qty: 50 TABLET | Refills: 0 | Status: SHIPPED | OUTPATIENT
Start: 2024-05-09 | End: 2024-05-19

## 2024-05-09 NOTE — PROGRESS NOTES
Name: Jhon Gentile      : 1995      MRN: [id number]  Encounter Provider: Noah Batista MD  Encounter Date: 2024   Encounter department: [facility] PR[facility]    Assessment & Plan     1. Thrush  Comments:  Based on findings in the mouth, recommend Mycelex.  1 5 times a day.  Orders:  -     clotrimazole (MYCELEX) 10 mg dameon; Take 1 tablet (10 mg total) by mouth 5 (five) times a day for 10 days    2. Viral upper respiratory tract infection  Comments:  No specific findings today.  Likely upper respiratory infection.  Mucinex over-the-counter.  If not better over weekend, antibiotics.  Check COVID at home.    3. Diarrhea, unspecified type  Comments:  Immodium PRN. Should improve within 48 hours.           Subjective      Chief Complaint   Patient presents with   • Sore Throat     Sx started 2 days ago, OTC medication not helping   • Loose Bowel Movements       Patient is having some sore throat, as well as loose bowel movements.  Some postnasal drip.  Not really having sinus symptoms.  Started approximately Monday or Tuesday.    Tried some over-the-counter cold medications, but they did not really seem to make much difference.  Stools are not particularly bothersome, but definitely different than his normal and much looser.          Review of Systems   Constitutional:  Positive for activity change and fatigue. Negative for appetite change, chills, diaphoresis and fever.   HENT:  Positive for congestion, postnasal drip, rhinorrhea and sore throat. Negative for dental problem, ear discharge, ear pain, sinus pressure and sinus pain.    Respiratory: Negative.     Cardiovascular: Negative.    Gastrointestinal:  Positive for diarrhea.       Current Outpatient Medications on File Prior to Visit   Medication Sig   • buPROPion (WELLBUTRIN XL) 300 mg 24 hr tablet TAKE 1 TABLET (300 MG TOTAL) BY MOUTH EVERY MORNING.   • levothyroxine 25 mcg tablet TAKE 1 TABLET (25 MCG TOTAL) BY MOUTH DAILY IN THE  EARLY MORNING   • ondansetron (ZOFRAN-ODT) 8 mg disintegrating tablet Take 1 tablet (8 mg total) by mouth every 8 (eight) hours as needed for nausea or vomiting for up to 3 days       Objective     /78 (BP Location: Left arm, Patient Position: Sitting, Cuff Size: Standard)   Pulse 96   Ht 6' (1.829 m)   Wt 98.4 kg (217 lb)   SpO2 98%   BMI 29.43 kg/m²     Physical Exam  Vitals and nursing note reviewed.   Constitutional:       Appearance: Normal appearance. He is well-developed.   HENT:      Head: Normocephalic and atraumatic.      Right Ear: Hearing, tympanic membrane, ear canal and external ear normal.      Left Ear: Hearing, tympanic membrane, ear canal and external ear normal.      Mouth/Throat:     Cardiovascular:      Rate and Rhythm: Normal rate and regular rhythm.      Pulses:           Carotid pulses are 2+ on the right side and 2+ on the left side.     Heart sounds: Normal heart sounds. No murmur heard.     No friction rub. No gallop.   Pulmonary:      Effort: Pulmonary effort is normal. No respiratory distress.      Breath sounds: Normal breath sounds. No wheezing or rales.   Musculoskeletal:      Cervical back: Normal range of motion and neck supple.   Lymphadenopathy:      Cervical: No cervical adenopathy.   Neurological:      Mental Status: He is alert.       Noah Batista MD

## 2024-05-09 NOTE — PATIENT INSTRUCTIONS
COVID 19 Instructions    Jhon Gentile was advised to limit contact with others to essential tasks such as getting food, medications, and medical care.    Proper handwashing reviewed, and Hand sanitzer when washing is not available.    If the patient develops symptoms of COVID 19, the patient should call the office as soon as possible.    It is strongly recommended that Flu Vaccinations be obtained.      Virtual Visits:  AmWell: This works on smart phones (any phone with Internet browsing capability).  You should get a text message when the provider is ready to see you.  Click on the link in the text message, and the call should start.  You will need to type in your name, and allow camera and microphone access.  This is HIPPA compliant, and secure.      If you have not already done so, get immunized to COVID 19.      We are committed to getting you vaccinated as soon as possible and will be closely following CDC and [facility] guidelines as they are released and revised.  Please refer to our COVID-19 vaccine webpage for the most up to date information on the vaccine and our distribution efforts.    This site will also have the most up to date recommendations for COVID booster vaccine.    [email or web address]    Call [phone] ([phone]), option 7    You can also visit https://www.vaccines.gov/ to find vaccines in your area.    OUR LOCATION:    [facility]  [address] [place] [address], [address]  [place], [place], [address] [phone]  Fax: [phone]    Lab services, Rheumatology, and OB/GYN are at this location as well.

## 2024-05-17 ENCOUNTER — OFFICE VISIT (OUTPATIENT)
Dept: FAMILY MEDICINE CLINIC | Facility: CLINIC | Age: 29
End: 2024-05-17

## 2024-05-17 VITALS
SYSTOLIC BLOOD PRESSURE: 120 MMHG | HEART RATE: 83 BPM | DIASTOLIC BLOOD PRESSURE: 90 MMHG | HEIGHT: 72 IN | TEMPERATURE: 98 F | WEIGHT: 220 LBS | BODY MASS INDEX: 29.8 KG/M2 | OXYGEN SATURATION: 98 %

## 2024-05-17 DIAGNOSIS — F90.9 ADULT ADHD: ICD-10-CM

## 2024-05-17 DIAGNOSIS — F41.1 GENERALIZED ANXIETY DISORDER: ICD-10-CM

## 2024-05-17 DIAGNOSIS — F41.0 PANIC DISORDER WITHOUT AGORAPHOBIA WITH SEVERE PANIC ATTACKS: ICD-10-CM

## 2024-05-17 DIAGNOSIS — K13.79 UVULAR HYPERTROPHY: Primary | ICD-10-CM

## 2024-05-17 DIAGNOSIS — F32.9 MAJOR DEPRESSIVE DISORDER, SINGLE EPISODE WITH ANXIOUS DISTRESS: ICD-10-CM

## 2024-05-17 PROCEDURE — 99214 OFFICE O/P EST MOD 30 MIN: CPT | Performed by: FAMILY MEDICINE

## 2024-05-17 RX ORDER — BUPROPION HYDROCHLORIDE 300 MG/1
300 TABLET ORAL EVERY MORNING
Qty: 90 TABLET | Refills: 2 | Status: SHIPPED | OUTPATIENT
Start: 2024-05-17

## 2024-05-17 RX ORDER — METHYLPREDNISOLONE 4 MG/1
TABLET ORAL
Qty: 21 EACH | Refills: 0 | Status: SHIPPED | OUTPATIENT
Start: 2024-05-17

## 2024-05-17 NOTE — PROGRESS NOTES
Ambulatory Visit  Name: Jhon Gentile      : 1995      MRN: [id number]  Encounter Provider: Emilee Chaudhary MD  Encounter Date: 2024   Encounter department: [facility]    Assessment & Plan   1. Uvular hypertrophy  -     Ambulatory Referral to Otolaryngology; Future  -     methylPREDNISolone 4 MG tablet therapy pack; Use as directed on package  2. Panic disorder without agoraphobia with severe panic attacks  -     buPROPion (WELLBUTRIN XL) 300 mg 24 hr tablet; Take 1 tablet (300 mg total) by mouth every morning  3. Major depressive disorder, single episode with anxious distress  -     buPROPion (WELLBUTRIN XL) 300 mg 24 hr tablet; Take 1 tablet (300 mg total) by mouth every morning  4. Generalized anxiety disorder  -     buPROPion (WELLBUTRIN XL) 300 mg 24 hr tablet; Take 1 tablet (300 mg total) by mouth every morning  5. Adult ADHD  -     buPROPion (WELLBUTRIN XL) 300 mg 24 hr tablet; Take 1 tablet (300 mg total) by mouth every morning       History of Present Illness     Patient presents with:  Sore Throat: Onset 24- was here last week and was told he had thrush uvula  feels  enlarged, has  trouble swallowing, snores  but  no aonea, occ  drools, has  to hydrate to make throat feel better, does  use  fan and  AC  in his room at  night, no ear pain or fever            Review of Systems   Constitutional:  Negative for activity change, appetite change and fatigue.   HENT:  Positive for sore throat.         Some uvula  swelling   Respiratory:  Negative for shortness of breath.    Cardiovascular:  Negative for chest pain.   Neurological:  Negative for dizziness, light-headedness and headaches.   Hematological:  Negative for adenopathy.       Objective     /90 (BP Location: Right arm, Patient Position: Sitting, Cuff Size: Adult)   Pulse 83   Temp 98 °F (36.7 °C) (Tympanic)   Ht 6' (1.829 m)   Wt 99.8 kg (220 lb)   SpO2 98%   BMI 29.84 kg/m²     Physical Exam  Vitals reviewed.    Constitutional:       Appearance: Normal appearance.   HENT:      Right Ear: Tympanic membrane normal.      Left Ear: Tympanic membrane normal.      Nose: No congestion or rhinorrhea.      Mouth/Throat:      Pharynx: No oropharyngeal exudate or posterior oropharyngeal erythema.      Comments: Some enlargement  of  uvula  Pulmonary:      Effort: Pulmonary effort is normal. No respiratory distress.   Lymphadenopathy:      Cervical: No cervical adenopathy.   Neurological:      Mental Status: He is alert.       Administrative Statements

## 2024-06-17 DIAGNOSIS — E03.9 HYPOTHYROIDISM, UNSPECIFIED TYPE: ICD-10-CM

## 2024-06-17 RX ORDER — LEVOTHYROXINE SODIUM 0.03 MG/1
25 TABLET ORAL
Qty: 90 TABLET | Refills: 0 | Status: SHIPPED | OUTPATIENT
Start: 2024-06-17

## 2024-08-21 ENCOUNTER — TELEPHONE (OUTPATIENT)
Dept: FAMILY MEDICINE CLINIC | Facility: CLINIC | Age: 29
End: 2024-08-21

## 2024-08-21 DIAGNOSIS — E80.4 GILBERT'S SYNDROME: ICD-10-CM

## 2024-08-21 DIAGNOSIS — E78.1 HYPERTRIGLYCERIDEMIA: ICD-10-CM

## 2024-08-21 DIAGNOSIS — E03.9 ACQUIRED HYPOTHYROIDISM: Primary | ICD-10-CM

## 2024-08-21 PROBLEM — E66.9 OBESITY (BMI 30-39.9): Status: RESOLVED | Noted: 2021-04-27 | Resolved: 2024-08-21

## 2024-08-21 NOTE — TELEPHONE ENCOUNTER
Patient did not show up for his routine follow-up today, 8/21/2024.  Please call patient have placed orders for blood work in computer and have him reschedule in the next 3 to 4 weeks

## 2024-08-22 NOTE — TELEPHONE ENCOUNTER
Pt notified and states he totally forgot and will go to lab this coming Monday then call to schedule appt.

## 2025-01-26 ENCOUNTER — APPOINTMENT (OUTPATIENT)
Dept: LAB | Facility: HOSPITAL | Age: 30
End: 2025-01-26
Payer: COMMERCIAL

## 2025-01-26 DIAGNOSIS — E03.9 ACQUIRED HYPOTHYROIDISM: ICD-10-CM

## 2025-01-26 DIAGNOSIS — E78.1 HYPERTRIGLYCERIDEMIA: ICD-10-CM

## 2025-01-26 DIAGNOSIS — E80.4 GILBERT'S SYNDROME: ICD-10-CM

## 2025-01-26 LAB
ALBUMIN SERPL BCG-MCNC: 4.4 G/DL (ref 3.5–5)
ALP SERPL-CCNC: 34 U/L (ref 34–104)
ALT SERPL W P-5'-P-CCNC: 35 U/L (ref 7–52)
ANION GAP SERPL CALCULATED.3IONS-SCNC: 6 MMOL/L (ref 4–13)
AST SERPL W P-5'-P-CCNC: 21 U/L (ref 13–39)
BILIRUB SERPL-MCNC: 1.96 MG/DL (ref 0.2–1)
BILIRUB UR QL STRIP: NEGATIVE
BUN SERPL-MCNC: 15 MG/DL (ref 5–25)
CALCIUM SERPL-MCNC: 9.3 MG/DL (ref 8.4–10.2)
CHLORIDE SERPL-SCNC: 106 MMOL/L (ref 96–108)
CHOLEST SERPL-MCNC: 227 MG/DL (ref ?–200)
CLARITY UR: CLEAR
CO2 SERPL-SCNC: 26 MMOL/L (ref 21–32)
COLOR UR: NORMAL
CREAT SERPL-MCNC: 0.84 MG/DL (ref 0.6–1.3)
ERYTHROCYTE [DISTWIDTH] IN BLOOD BY AUTOMATED COUNT: 12.5 % (ref 11.6–15.1)
GFR SERPL CREATININE-BSD FRML MDRD: 119 ML/MIN/1.73SQ M
GLUCOSE P FAST SERPL-MCNC: 108 MG/DL (ref 65–99)
GLUCOSE UR STRIP-MCNC: NEGATIVE MG/DL
HCT VFR BLD AUTO: 43.9 % (ref 36.5–49.3)
HDLC SERPL-MCNC: 54 MG/DL
HGB BLD-MCNC: 15.1 G/DL (ref 12–17)
HGB UR QL STRIP.AUTO: NEGATIVE
KETONES UR STRIP-MCNC: NEGATIVE MG/DL
LDLC SERPL CALC-MCNC: 158 MG/DL (ref 0–100)
LEUKOCYTE ESTERASE UR QL STRIP: NEGATIVE
MCH RBC QN AUTO: 29.5 PG (ref 26.8–34.3)
MCHC RBC AUTO-ENTMCNC: 34.4 G/DL (ref 31.4–37.4)
MCV RBC AUTO: 86 FL (ref 82–98)
NITRITE UR QL STRIP: NEGATIVE
PH UR STRIP.AUTO: 5.5 [PH]
PLATELET # BLD AUTO: 198 THOUSANDS/UL (ref 149–390)
PMV BLD AUTO: 9.8 FL (ref 8.9–12.7)
POTASSIUM SERPL-SCNC: 4.3 MMOL/L (ref 3.5–5.3)
PROT SERPL-MCNC: 6.9 G/DL (ref 6.4–8.4)
PROT UR STRIP-MCNC: NEGATIVE MG/DL
RBC # BLD AUTO: 5.12 MILLION/UL (ref 3.88–5.62)
SODIUM SERPL-SCNC: 138 MMOL/L (ref 135–147)
SP GR UR STRIP.AUTO: 1.02 (ref 1–1.03)
TRIGL SERPL-MCNC: 74 MG/DL (ref ?–150)
TSH SERPL DL<=0.05 MIU/L-ACNC: 1.85 UIU/ML (ref 0.45–4.5)
UROBILINOGEN UR STRIP-ACNC: <2 MG/DL
WBC # BLD AUTO: 3.33 THOUSAND/UL (ref 4.31–10.16)

## 2025-01-26 PROCEDURE — 81003 URINALYSIS AUTO W/O SCOPE: CPT

## 2025-01-26 PROCEDURE — 85027 COMPLETE CBC AUTOMATED: CPT

## 2025-01-27 ENCOUNTER — RESULTS FOLLOW-UP (OUTPATIENT)
Dept: FAMILY MEDICINE CLINIC | Facility: CLINIC | Age: 30
End: 2025-01-27

## 2025-02-10 ENCOUNTER — OFFICE VISIT (OUTPATIENT)
Dept: FAMILY MEDICINE CLINIC | Facility: CLINIC | Age: 30
End: 2025-02-10

## 2025-02-10 VITALS
HEIGHT: 72 IN | BODY MASS INDEX: 34 KG/M2 | OXYGEN SATURATION: 98 % | WEIGHT: 251 LBS | SYSTOLIC BLOOD PRESSURE: 118 MMHG | DIASTOLIC BLOOD PRESSURE: 66 MMHG | HEART RATE: 102 BPM

## 2025-02-10 DIAGNOSIS — J30.9 ALLERGIC RHINITIS, UNSPECIFIED SEASONALITY, UNSPECIFIED TRIGGER: ICD-10-CM

## 2025-02-10 DIAGNOSIS — H69.90 DYSFUNCTION OF EUSTACHIAN TUBE, UNSPECIFIED LATERALITY: ICD-10-CM

## 2025-02-10 DIAGNOSIS — Z00.00 HEALTHCARE MAINTENANCE: Primary | ICD-10-CM

## 2025-02-10 DIAGNOSIS — E66.9 OBESITY (BMI 30-39.9): ICD-10-CM

## 2025-02-10 DIAGNOSIS — E80.4 GILBERT'S SYNDROME: ICD-10-CM

## 2025-02-10 DIAGNOSIS — F90.9 ADULT ADHD: ICD-10-CM

## 2025-02-10 DIAGNOSIS — E03.9 ACQUIRED HYPOTHYROIDISM: ICD-10-CM

## 2025-02-10 DIAGNOSIS — R73.9 HYPERGLYCEMIA: ICD-10-CM

## 2025-02-10 DIAGNOSIS — F41.1 GENERALIZED ANXIETY DISORDER: ICD-10-CM

## 2025-02-10 DIAGNOSIS — F41.0 PANIC DISORDER WITHOUT AGORAPHOBIA WITH SEVERE PANIC ATTACKS: ICD-10-CM

## 2025-02-10 DIAGNOSIS — F32.9 MAJOR DEPRESSIVE DISORDER, SINGLE EPISODE WITH ANXIOUS DISTRESS: ICD-10-CM

## 2025-02-10 DIAGNOSIS — D72.819 LEUKOPENIA, UNSPECIFIED TYPE: ICD-10-CM

## 2025-02-10 DIAGNOSIS — E78.00 HYPERCHOLESTEROLEMIA: ICD-10-CM

## 2025-02-10 PROBLEM — F44.5 PSYCHOGENIC NONEPILEPTIC SEIZURE: Status: RESOLVED | Noted: 2019-01-21 | Resolved: 2025-02-10

## 2025-02-10 PROBLEM — E78.1 HYPERTRIGLYCERIDEMIA: Status: RESOLVED | Noted: 2022-01-12 | Resolved: 2025-02-10

## 2025-02-10 PROBLEM — K13.79 UVULAR HYPERTROPHY: Status: RESOLVED | Noted: 2024-05-17 | Resolved: 2025-02-10

## 2025-02-10 PROCEDURE — 99214 OFFICE O/P EST MOD 30 MIN: CPT | Performed by: FAMILY MEDICINE

## 2025-02-10 PROCEDURE — 99395 PREV VISIT EST AGE 18-39: CPT | Performed by: FAMILY MEDICINE

## 2025-02-10 NOTE — PROGRESS NOTES
Adult Annual Physical  Name: Jhon Gentile      : 1995      MRN: [id number]  Encounter Provider: Jamar Schmid DO  Encounter Date: 2/10/2025   Encounter department: [facility]   return in 6 months for office visit and blood work    Assessment & Plan  Healthcare maintenance  Annual physical completed  Patient has declined influenza and COVID-vaccine       Acquired hypothyroidism  Stable continue levothyroxine 25 mcg daily  Orders:  •  CBC; Future  •  Comprehensive metabolic panel; Future  •  Hemoglobin A1C; Future  •  Lipid Panel with Direct LDL reflex; Future  •  TSH, 3rd generation with Free T4 reflex; Future  •  UA (URINE) with reflex to Scope; Future    Allergic rhinitis, unspecified seasonality, unspecified trigger  Asymptomatic at present  Orders:  •  CBC; Future  •  Comprehensive metabolic panel; Future  •  Hemoglobin A1C; Future  •  Lipid Panel with Direct LDL reflex; Future  •  TSH, 3rd generation with Free T4 reflex; Future  •  UA (URINE) with reflex to Scope; Future    Dysfunction of Eustachian tube, unspecified laterality  Asymptomatic at present  Orders:  •  CBC; Future  •  Comprehensive metabolic panel; Future  •  Hemoglobin A1C; Future  •  Lipid Panel with Direct LDL reflex; Future  •  TSH, 3rd generation with Free T4 reflex; Future  •  UA (URINE) with reflex to Scope; Future    Adult ADHD  Is not seeing  psychiatry, stable on Wellbutrin    Orders:  •  CBC; Future  •  Comprehensive metabolic panel; Future  •  Hemoglobin A1C; Future  •  Lipid Panel with Direct LDL reflex; Future  •  TSH, 3rd generation with Free T4 reflex; Future  •  UA (URINE) with reflex to Scope; Future    Generalized anxiety disorder  In remission on Wellbutrin  Orders:  •  CBC; Future  •  Comprehensive metabolic panel; Future  •  Hemoglobin A1C; Future  •  Lipid Panel with Direct LDL reflex; Future  •  TSH, 3rd generation with Free T4 reflex; Future  •  UA (URINE) with reflex to Scope;  Future    Major depressive disorder, single episode with anxious distress  In remission on Wellbutrin  Orders:  •  CBC; Future  •  Comprehensive metabolic panel; Future  •  Hemoglobin A1C; Future  •  Lipid Panel with Direct LDL reflex; Future  •  TSH, 3rd generation with Free T4 reflex; Future  •  UA (URINE) with reflex to Scope; Future    Panic disorder without agoraphobia with severe panic attacks  In remission on Wellbutrin  Orders:  •  CBC; Future  •  Comprehensive metabolic panel; Future  •  Hemoglobin A1C; Future  •  Lipid Panel with Direct LDL reflex; Future  •  TSH, 3rd generation with Free T4 reflex; Future  •  UA (URINE) with reflex to Scope; Future    Gilbert's syndrome  Stable continue to monitor bilirubin  Orders:  •  CBC; Future  •  Comprehensive metabolic panel; Future  •  Hemoglobin A1C; Future  •  Lipid Panel with Direct LDL reflex; Future  •  TSH, 3rd generation with Free T4 reflex; Future  •  UA (URINE) with reflex to Scope; Future    Hypercholesterolemia  Fat low-cholesterol diet recommended  Orders:  •  CBC; Future  •  Comprehensive metabolic panel; Future  •  Hemoglobin A1C; Future  •  Lipid Panel with Direct LDL reflex; Future  •  TSH, 3rd generation with Free T4 reflex; Future  •  UA (URINE) with reflex to Scope; Future    Obesity (BMI 30-39.9)   BMI 34.04 patient gained 31 pounds diet exercise weight loss recommended  Orders:  •  CBC; Future  •  Comprehensive metabolic panel; Future  •  Hemoglobin A1C; Future  •  Lipid Panel with Direct LDL reflex; Future  •  TSH, 3rd generation with Free T4 reflex; Future  •  UA (URINE) with reflex to Scope; Future    Leukopenia, unspecified type    Orders:  •  CBC and differential; Future  •  CBC; Future  •  Comprehensive metabolic panel; Future  •  Hemoglobin A1C; Future  •  Lipid Panel with Direct LDL reflex; Future  •  TSH, 3rd generation with Free T4 reflex; Future  •  UA (URINE) with reflex to Scope; Future    Hyperglycemia  Low sugar low carbohydrate  diet  Orders:  •  CBC; Future  •  Comprehensive metabolic panel; Future  •  Hemoglobin A1C; Future  •  Lipid Panel with Direct LDL reflex; Future  •  TSH, 3rd generation with Free T4 reflex; Future  •  UA (URINE) with reflex to Scope; Future    Immunizations and preventive care screenings were discussed with patient today. Appropriate education was printed on patient's after visit summary.    Counseling:  Immunization, influenza and COVID refused      Depression Screening and Follow-up Plan: Patient was screened for depression during today's encounter. They screened negative with a PHQ-9 score of 0.        History of Present Illness     Adult Annual Physical:  Patient presents for annual physical. Patient in for a physical in addition we did a follow-up with medication and labs.  Patient aware there is a bill for 2 office visit.     Diet and Physical Activity:  - Diet/Nutrition: poor diet and frequent junk food.  - Exercise: walking and 5-7 times a week on average.    Depression Screening:    - PHQ-9 Score: 0    General Health:  - Sleep: sleeps well.  - Hearing: normal hearing right ear.  - Vision: no vision problems.  - Dental: no dental visits for > 1 year.     Health:  - History of STDs: no.   - Urinary symptoms: none.     Advanced Care Planning:  - Has an advanced directive?: no    - Has a durable medical POA?: no    - ACP document given to patient?: yes      Review of Systems   Constitutional: Negative.    HENT: Negative.     Eyes: Negative.    Respiratory: Negative.     Cardiovascular: Negative.    Gastrointestinal: Negative.    Endocrine: Negative.    Genitourinary: Negative.    Musculoskeletal: Negative.    Skin: Negative.    Allergic/Immunologic: Negative.    Neurological: Negative.    Hematological: Negative.    Psychiatric/Behavioral: Negative.           Objective   /66 (BP Location: Right arm, Patient Position: Sitting, Cuff Size: Large)   Pulse 102   Ht 6' (1.829 m)   Wt 114 kg (251 lb)    SpO2 98%   BMI 34.04 kg/m²     Physical Exam  Vitals and nursing note reviewed.   Constitutional:       Appearance: Normal appearance. He is obese.   HENT:      Head: Normocephalic and atraumatic.      Right Ear: Tympanic membrane normal.      Left Ear: Tympanic membrane normal.      Nose: Nose normal.      Mouth/Throat:      Mouth: Mucous membranes are moist.      Pharynx: Oropharynx is clear. No oropharyngeal exudate or posterior oropharyngeal erythema.   Eyes:      Conjunctiva/sclera: Conjunctivae normal.   Neck:      Vascular: No carotid bruit.   Cardiovascular:      Rate and Rhythm: Normal rate and regular rhythm.      Heart sounds: Normal heart sounds.   Pulmonary:      Effort: Pulmonary effort is normal.      Breath sounds: Normal breath sounds.   Abdominal:      General: Bowel sounds are normal.      Palpations: Abdomen is soft.      Tenderness: There is no abdominal tenderness.   Musculoskeletal:      Cervical back: Neck supple.      Right lower leg: No edema.      Left lower leg: No edema.   Lymphadenopathy:      Cervical: No cervical adenopathy.   Skin:     General: Skin is warm and dry.   Neurological:      General: No focal deficit present.      Mental Status: He is alert.   Psychiatric:         Mood and Affect: Mood normal.

## 2025-02-10 NOTE — PATIENT INSTRUCTIONS
Exercise weight loss recommended  Specifically low sugar low-fat low carbohydrate  Repeat CBC for white blood cell count in the near future  Continue present therapy  Return in 6 months for office visit and blood work sooner if needed    Cholesterol and Your Health   AMBULATORY CARE:   Cholesterol  is a waxy, fat-like substance. Cholesterol is made by your body, but also comes from certain foods you eat. Your body uses cholesterol to make hormones and new cells. Your body also uses cholesterol to protect nerves. Cholesterol comes from foods such as meat and dairy products. Your total cholesterol level is made up by LDL cholesterol, HDL cholesterol, and triglycerides:  LDL cholesterol  is called bad cholesterol  because it forms plaque in your arteries. As plaque builds up, your arteries become narrow, and less blood flows through. When plaque decreases blood flow to your heart, you may have chest pain. If plaque completely blocks an artery that bring blood to your heart, you may have a heart attack. Plaque can break off and form blood clots. Blood clots may block arteries in your brain and cause a stroke.           HDL cholesterol  is called good cholesterol  because it helps remove LDL cholesterol from your arteries. It does this by attaching to LDL cholesterol and carrying it to your liver. Your liver breaks down LDL cholesterol so your body can get rid of it. High levels of HDL cholesterol can help prevent a heart attack and stroke. Low levels of HDL cholesterol can increase your risk for heart disease, heart attack, and stroke.     Triglycerides  are a type of fat that store energy from foods you eat. High levels of triglycerides also cause plaque buildup. This can increase your risk for a heart attack or stroke. If your triglyceride level is high, your LDL cholesterol level may also be high.  How food affects your cholesterol levels:   Unhealthy fats  increase LDL cholesterol and triglyceride levels in your  blood. They are found in foods high in cholesterol, saturated fat, and trans fat:     Cholesterol  is found in eggs, dairy, and meat.     Saturated fat  is found in butter, cheese, ice cream, whole milk, and coconut oil. Saturated fat is also found in meat, such as sausage, hot dogs, and bologna.    Trans fat  is found in liquid oils and is used in fried and baked foods. Foods that contain trans fats include chips, crackers, muffins, sweet rolls, microwave popcorn, and cookies.    Healthy fats,  also called unsaturated fats, help lower LDL cholesterol and triglyceride levels. Healthy fats include the following:     Monounsaturated fats  are found in foods such as olive oil, canola oil, avocado, nuts, and olives.    Polyunsaturated fats,  such as omega 3 fats, are found in fish, such as salmon, trout, and tuna. They can also be found in plant foods such as flaxseed, walnuts, and soybeans.  Other things that affect your cholesterol levels:   Smoking cigarettes    Being overweight or obese     Drinking large amounts of alcohol    Not enough exercise or no exercise    Certain genes passed from your parents to you  What you need to know about having your cholesterol levels checked:  Adults 20 to 45 years of age should have their cholesterol levels checked every 4 to 6 years. Adults 45 years and older should have their cholesterol checked every 1 to 2 years. You may need your cholesterol checked more often, or at a younger age, if you have risk factors for heart disease. You may also need to have your cholesterol checked more often if you have other health conditions, such as diabetes. Blood tests are used to check cholesterol levels. Blood tests measure your levels of triglycerides, LDL cholesterol, and HDL cholesterol.   Cholesterol level goals:  Your cholesterol level goal may depend on your risk for heart disease. It may also depend on your age and other health conditions. Ask your healthcare provider if the following  goals are right for you:  Your total cholesterol level  should be less than 200 mg/dL. This number may also depend on your HDL and LDL cholesterol goals.     Your LDL cholesterol level  should be less than 130 mg/dL.     Your HDL cholesterol level  should be 60 mg/dL or higher.     Your triglyceride level  should be less than 150 mg/dL.  Treatment for high cholesterol:  Treatment for high cholesterol will also decrease your risk of heart disease, heart attack, and stroke. Treatment may include any of the following:  Medicines  may be given to lower your LDL cholesterol, triglyceride levels, or total cholesterol level. You may need medicines to lower your cholesterol if any of the following is true:     You have a history of stroke, TIA, unstable angina, or a heart attack    Your LDL cholesterol level is 190 mg/dL or higher    You are age 40 to 75 years of age, have diabetes, and your LDL cholesterol is 70 mg/dL or higher    You are age 40 to 75 years of age, have risk factors for heart disease, and your LDL cholesterol is 70 mg/dL or higher    Lifestyle changes  include changes to your diet, exercise, weight loss, and quitting smoking. It also includes decreasing the amount of alcohol you drink.     Supplements  include fish oil, red yeast rice, and garlic. Fish oil may help lower your triglyceride and LDL cholesterol levels. It may also increase your HDL cholesterol level. Red yeast rice may help decrease your total cholesterol level and LDL cholesterol level. Garlic may help lower your total cholesterol level. Do not take these supplements without talking to your healthcare provider.   Nutrition to help lower your cholesterol levels:  A registered dietitian can help you create a healthy eating plan. Read food labels and choose foods low in saturated fat, trans fats, and cholesterol.  Decrease the total amount of fat you eat.  Choose lean meats, fat-free or 1% fat milk, and low-fat dairy products, such as yogurt  and cheese. Try to limit or avoid red meats. Limit or do not eat fried foods or baked goods such as cookies.     Replace unhealthy fats with healthy fats.  Cook foods in olive oil or canola oil. Choose soft margarines that are low in saturated fat and trans fat. Seeds, nuts, and avocados are other examples of healthy fats.     Eat foods with omega-3 fats.  Examples include salmon, tuna, mackerel, walnuts, and flaxseed. Eat fish 2 times per week. Children and pregnant women should not eat fish that have high levels of mercury, such as shark, swordfish, and charlotte mackerel.    Increase the amount of plant-based foods you eat.  Plant-based foods are low in cholesterol and fat. Eating more of these foods may help lower your cholesterol and help you lose weight. Examples of plant-based foods includes fruits, vegetables, legumes, and whole grains. Replace milk that contains dairy with almond, soy, or coconut milk. Eat beans and foods with soy for protein instead of meat. Ask your healthcare provider or dietitian for more information on plant-based foods.     Increase the amount of fiber you eat.  High-fiber foods can help lower your LDL cholesterol. You should eat between 20 and 30 grams of fiber each day. Eat at least 5 servings of fruits and vegetables each day. Other examples of high-fiber foods include whole-grain or whole-wheat breads, pastas, or cereals, and brown rice. Eat 3 ounces of whole-grain foods each day. Increase fiber slowly. You may have abdominal discomfort, bloating, and gas if you add fiber to your diet too quickly.  Lifestyle changes you can make to help lower your cholesterol levels:   Maintain a healthy weight.  Ask your healthcare provider how much you should weigh. Ask him or her to help you create a weight loss plan if you are overweight. Weight loss can decrease your total cholesterol and triglyceride levels.     Exercise regularly.  Exercise can help lower your total cholesterol level and  maintain a healthy weight. Exercise can also help increase your HDL cholesterol level. Work with your healthcare provider to create an exercise program that is right for you. Get at least 30 minutes of moderate exercise most days of the week. Examples of exercise include brisk walking, swimming, or biking.     Do not smoke.  Nicotine and other chemicals in cigarettes and cigars can damage your lungs, heart, and blood vessels. They can also raise your triglyceride levels. Ask your healthcare provider for information if you currently smoke and need help to quit. E-cigarettes or smokeless tobacco still contain nicotine. Talk to your healthcare provider before you use these products.    Limit or do not drink alcohol.  Alcohol can increase your triglyceride levels. Ask your healthcare provider if it is safe for you to drink alcohol. Also ask how much is safe for you to drink each day.  © 2017 [employer] Information is for End User's use only and may not be sold, redistributed or otherwise used for commercial purposes. All illustrations and images included in CareNotes® are the copyrighted property of [employer] or [employer].  The above information is an  only. It is not intended as medical advice for individual conditions or treatments. Talk to your doctor, nurse or pharmacist before following any medical regimen to see if it is safe and effective for you.    yes

## 2025-08-18 ENCOUNTER — TELEPHONE (OUTPATIENT)
Dept: FAMILY MEDICINE CLINIC | Facility: CLINIC | Age: 30
End: 2025-08-18